# Patient Record
Sex: MALE | Race: WHITE | NOT HISPANIC OR LATINO | Employment: OTHER
[De-identification: names, ages, dates, MRNs, and addresses within clinical notes are randomized per-mention and may not be internally consistent; named-entity substitution may affect disease eponyms.]

---

## 2017-03-21 ENCOUNTER — RECORDS - HEALTHEAST (OUTPATIENT)
Dept: ADMINISTRATIVE | Facility: OTHER | Age: 47
End: 2017-03-21

## 2017-03-28 ENCOUNTER — OFFICE VISIT - HEALTHEAST (OUTPATIENT)
Dept: FAMILY MEDICINE | Facility: CLINIC | Age: 47
End: 2017-03-28

## 2017-03-28 DIAGNOSIS — G89.4 CHRONIC PAIN SYNDROME: ICD-10-CM

## 2017-03-28 DIAGNOSIS — F09 COGNITIVE DISORDER: ICD-10-CM

## 2017-03-28 DIAGNOSIS — Q28.3 CONGENITAL ANOMALY OF CEREBROVASCULAR SYSTEM: ICD-10-CM

## 2017-03-28 DIAGNOSIS — F99 CHRONIC MENTAL ILLNESS: ICD-10-CM

## 2017-03-28 ASSESSMENT — MIFFLIN-ST. JEOR: SCORE: 2049.36

## 2017-09-19 ENCOUNTER — OFFICE VISIT - HEALTHEAST (OUTPATIENT)
Dept: FAMILY MEDICINE | Facility: CLINIC | Age: 47
End: 2017-09-19

## 2017-09-19 DIAGNOSIS — R35.0 URINARY FREQUENCY: ICD-10-CM

## 2017-09-19 DIAGNOSIS — N48.6 PEYRONIE'S DISEASE: ICD-10-CM

## 2017-09-19 DIAGNOSIS — Q28.3 CONGENITAL ANOMALY OF CEREBROVASCULAR SYSTEM: ICD-10-CM

## 2017-09-19 DIAGNOSIS — F99 CHRONIC MENTAL ILLNESS: ICD-10-CM

## 2017-09-19 DIAGNOSIS — Z00.00 ROUTINE GENERAL MEDICAL EXAMINATION AT A HEALTH CARE FACILITY: ICD-10-CM

## 2017-09-19 LAB
CHOLEST SERPL-MCNC: 146 MG/DL
FASTING STATUS PATIENT QL REPORTED: YES
HDLC SERPL-MCNC: 34 MG/DL
LDLC SERPL CALC-MCNC: 89 MG/DL
TRIGL SERPL-MCNC: 117 MG/DL

## 2017-09-19 ASSESSMENT — MIFFLIN-ST. JEOR: SCORE: 1974.41

## 2017-09-20 ENCOUNTER — COMMUNICATION - HEALTHEAST (OUTPATIENT)
Dept: FAMILY MEDICINE | Facility: CLINIC | Age: 47
End: 2017-09-20

## 2018-01-17 ENCOUNTER — COMMUNICATION - HEALTHEAST (OUTPATIENT)
Dept: FAMILY MEDICINE | Facility: CLINIC | Age: 48
End: 2018-01-17

## 2018-01-23 ENCOUNTER — OFFICE VISIT - HEALTHEAST (OUTPATIENT)
Dept: FAMILY MEDICINE | Facility: CLINIC | Age: 48
End: 2018-01-23

## 2018-01-23 DIAGNOSIS — F99 CHRONIC MENTAL ILLNESS: ICD-10-CM

## 2018-01-23 DIAGNOSIS — N48.6 PEYRONIE'S DISEASE: ICD-10-CM

## 2018-01-23 DIAGNOSIS — F09 COGNITIVE DISORDER: ICD-10-CM

## 2018-01-23 DIAGNOSIS — B36.0 TINEA VERSICOLOR: ICD-10-CM

## 2018-01-23 DIAGNOSIS — F32.A DEPRESSION: ICD-10-CM

## 2018-01-23 DIAGNOSIS — F41.9 ANXIETY: ICD-10-CM

## 2018-01-23 DIAGNOSIS — Q28.3 CONGENITAL ANOMALY OF CEREBROVASCULAR SYSTEM: ICD-10-CM

## 2018-02-16 ENCOUNTER — RECORDS - HEALTHEAST (OUTPATIENT)
Dept: ADMINISTRATIVE | Facility: OTHER | Age: 48
End: 2018-02-16

## 2018-03-02 ENCOUNTER — COMMUNICATION - HEALTHEAST (OUTPATIENT)
Dept: SCHEDULING | Facility: CLINIC | Age: 48
End: 2018-03-02

## 2018-03-09 ENCOUNTER — AMBULATORY - HEALTHEAST (OUTPATIENT)
Dept: FAMILY MEDICINE | Facility: CLINIC | Age: 48
End: 2018-03-09

## 2018-03-09 ENCOUNTER — OFFICE VISIT - HEALTHEAST (OUTPATIENT)
Dept: FAMILY MEDICINE | Facility: CLINIC | Age: 48
End: 2018-03-09

## 2018-03-09 DIAGNOSIS — R07.9 CHEST PAIN: ICD-10-CM

## 2018-03-12 LAB
ATRIAL RATE - MUSE: 91 BPM
DIASTOLIC BLOOD PRESSURE - MUSE: NORMAL MMHG
INTERPRETATION ECG - MUSE: NORMAL
P AXIS - MUSE: 22 DEGREES
PR INTERVAL - MUSE: 138 MS
QRS DURATION - MUSE: 86 MS
QT - MUSE: 348 MS
QTC - MUSE: 428 MS
R AXIS - MUSE: 64 DEGREES
SYSTOLIC BLOOD PRESSURE - MUSE: NORMAL MMHG
T AXIS - MUSE: 20 DEGREES
VENTRICULAR RATE- MUSE: 91 BPM

## 2018-03-20 ENCOUNTER — RECORDS - HEALTHEAST (OUTPATIENT)
Dept: ADMINISTRATIVE | Facility: OTHER | Age: 48
End: 2018-03-20

## 2018-03-22 ENCOUNTER — HOSPITAL ENCOUNTER (OUTPATIENT)
Dept: NUCLEAR MEDICINE | Facility: CLINIC | Age: 48
Discharge: HOME | End: 2018-03-22
Attending: FAMILY MEDICINE

## 2018-03-22 ENCOUNTER — COMMUNICATION - HEALTHEAST (OUTPATIENT)
Dept: TELEHEALTH | Facility: CLINIC | Age: 48
End: 2018-03-22

## 2018-03-22 ENCOUNTER — HOSPITAL ENCOUNTER (OUTPATIENT)
Dept: CARDIOLOGY | Facility: CLINIC | Age: 48
Discharge: HOME | End: 2018-03-22
Attending: FAMILY MEDICINE

## 2018-03-22 DIAGNOSIS — R07.9 CHEST PAIN: ICD-10-CM

## 2018-03-22 LAB
CV STRESS CURRENT BP HE: NORMAL
CV STRESS CURRENT HR HE: 100
CV STRESS CURRENT HR HE: 104
CV STRESS CURRENT HR HE: 105
CV STRESS CURRENT HR HE: 106
CV STRESS CURRENT HR HE: 106
CV STRESS CURRENT HR HE: 108
CV STRESS CURRENT HR HE: 108
CV STRESS CURRENT HR HE: 109
CV STRESS CURRENT HR HE: 109
CV STRESS CURRENT HR HE: 114
CV STRESS CURRENT HR HE: 115
CV STRESS CURRENT HR HE: 117
CV STRESS CURRENT HR HE: 119
CV STRESS CURRENT HR HE: 122
CV STRESS CURRENT HR HE: 122
CV STRESS CURRENT HR HE: 126
CV STRESS CURRENT HR HE: 126
CV STRESS CURRENT HR HE: 135
CV STRESS CURRENT HR HE: 137
CV STRESS CURRENT HR HE: 144
CV STRESS CURRENT HR HE: 145
CV STRESS CURRENT HR HE: 146
CV STRESS CURRENT HR HE: 149
CV STRESS CURRENT HR HE: 153
CV STRESS CURRENT HR HE: 154
CV STRESS CURRENT HR HE: 155
CV STRESS CURRENT HR HE: 83
CV STRESS CURRENT HR HE: 87
CV STRESS CURRENT HR HE: 96
CV STRESS DEVIATION TIME HE: NORMAL
CV STRESS ECHO PERCENT HR HE: NORMAL
CV STRESS EXERCISE STAGE HE: NORMAL
CV STRESS EXERCISE STAGE REACHED HE: NORMAL
CV STRESS FINAL RESTING BP HE: NORMAL
CV STRESS FINAL RESTING HR HE: 108
CV STRESS MAX HR HE: 156
CV STRESS MAX TREADMILL GRADE HE: 14
CV STRESS MAX TREADMILL SPEED HE: 3.4
CV STRESS PEAK DIA BP HE: NORMAL
CV STRESS PEAK SYS BP HE: NORMAL
CV STRESS PHASE HE: NORMAL
CV STRESS PROTOCOL HE: NORMAL
CV STRESS RESTING PT POSITION HE: NORMAL
CV STRESS RESTING PT POSITION HE: NORMAL
CV STRESS ST DEVIATION AMOUNT HE: NORMAL
CV STRESS ST DEVIATION ELEVATION HE: NORMAL
CV STRESS ST EVELATION AMOUNT HE: NORMAL
CV STRESS TEST TYPE HE: NORMAL
CV STRESS TOTAL STAGE TIME MIN 1 HE: NORMAL
NUC STRESS EJECTION FRACTION: 70 %
STRESS ECHO BASELINE BP: NORMAL
STRESS ECHO BASELINE HR: 87
STRESS ECHO CALCULATED PERCENT HR: 89 %
STRESS ECHO LAST STRESS BP: NORMAL
STRESS ECHO LAST STRESS HR: 154
STRESS ECHO POST ESTIMATED WORKLOAD: 8.9
STRESS ECHO POST EXERCISE DUR MIN: 7
STRESS ECHO POST EXERCISE DUR SEC: 20
STRESS ECHO TARGET HR: 149

## 2018-03-29 ENCOUNTER — OFFICE VISIT - HEALTHEAST (OUTPATIENT)
Dept: CARDIOLOGY | Facility: CLINIC | Age: 48
End: 2018-03-29

## 2018-03-29 DIAGNOSIS — R07.2 PRECORDIAL PAIN: ICD-10-CM

## 2018-03-29 ASSESSMENT — MIFFLIN-ST. JEOR: SCORE: 2035.19

## 2018-04-20 ENCOUNTER — RECORDS - HEALTHEAST (OUTPATIENT)
Dept: ADMINISTRATIVE | Facility: OTHER | Age: 48
End: 2018-04-20

## 2019-10-07 ENCOUNTER — RECORDS - HEALTHEAST (OUTPATIENT)
Dept: ADMINISTRATIVE | Facility: OTHER | Age: 49
End: 2019-10-07

## 2019-10-10 ENCOUNTER — HOSPITAL ENCOUNTER (OUTPATIENT)
Dept: MRI IMAGING | Facility: CLINIC | Age: 49
Discharge: HOME | End: 2019-10-10

## 2019-10-10 DIAGNOSIS — I67.1 CEREBRAL ANEURYSM: ICD-10-CM

## 2019-11-04 ENCOUNTER — OFFICE VISIT - HEALTHEAST (OUTPATIENT)
Dept: FAMILY MEDICINE | Facility: CLINIC | Age: 49
End: 2019-11-04

## 2019-11-04 DIAGNOSIS — Q28.3 CONGENITAL ANOMALY OF CEREBROVASCULAR SYSTEM: ICD-10-CM

## 2019-11-04 DIAGNOSIS — N48.6 PEYRONIE'S DISEASE: ICD-10-CM

## 2019-11-04 DIAGNOSIS — E55.9 VITAMIN D DEFICIENCY: ICD-10-CM

## 2019-11-04 DIAGNOSIS — F99 CHRONIC MENTAL ILLNESS: ICD-10-CM

## 2019-11-04 DIAGNOSIS — Z00.00 ROUTINE GENERAL MEDICAL EXAMINATION AT A HEALTH CARE FACILITY: ICD-10-CM

## 2019-11-04 LAB
CHOLEST SERPL-MCNC: 142 MG/DL
FASTING STATUS PATIENT QL REPORTED: YES
FASTING STATUS PATIENT QL REPORTED: YES
GLUCOSE BLD-MCNC: 68 MG/DL (ref 70–125)
HDLC SERPL-MCNC: 37 MG/DL
LDLC SERPL CALC-MCNC: 84 MG/DL
TRIGL SERPL-MCNC: 104 MG/DL

## 2019-11-04 ASSESSMENT — MIFFLIN-ST. JEOR: SCORE: 2006.61

## 2019-11-05 ENCOUNTER — COMMUNICATION - HEALTHEAST (OUTPATIENT)
Dept: FAMILY MEDICINE | Facility: CLINIC | Age: 49
End: 2019-11-05

## 2019-11-05 LAB — 25(OH)D3 SERPL-MCNC: 25.8 NG/ML (ref 30–80)

## 2020-11-06 ENCOUNTER — OFFICE VISIT - HEALTHEAST (OUTPATIENT)
Dept: FAMILY MEDICINE | Facility: CLINIC | Age: 50
End: 2020-11-06

## 2020-11-06 DIAGNOSIS — F99 CHRONIC MENTAL ILLNESS: ICD-10-CM

## 2020-11-06 DIAGNOSIS — R33.9 INCOMPLETE BLADDER EMPTYING: ICD-10-CM

## 2020-11-06 DIAGNOSIS — R39.15 URINARY URGENCY: ICD-10-CM

## 2020-11-06 DIAGNOSIS — E55.9 VITAMIN D DEFICIENCY: ICD-10-CM

## 2020-11-06 DIAGNOSIS — N48.6 PEYRONIE'S DISEASE: ICD-10-CM

## 2020-11-06 DIAGNOSIS — Z00.00 ROUTINE GENERAL MEDICAL EXAMINATION AT A HEALTH CARE FACILITY: ICD-10-CM

## 2020-11-06 DIAGNOSIS — Q28.3 CONGENITAL ANOMALY OF CEREBROVASCULAR SYSTEM: ICD-10-CM

## 2020-11-06 LAB
CHOLEST SERPL-MCNC: 159 MG/DL
FASTING STATUS PATIENT QL REPORTED: YES
FASTING STATUS PATIENT QL REPORTED: YES
GLUCOSE BLD-MCNC: 81 MG/DL (ref 70–125)
HDLC SERPL-MCNC: 38 MG/DL
LDLC SERPL CALC-MCNC: 92 MG/DL
TRIGL SERPL-MCNC: 145 MG/DL

## 2020-11-06 ASSESSMENT — MIFFLIN-ST. JEOR: SCORE: 2017.05

## 2020-11-09 LAB — 25(OH)D3 SERPL-MCNC: 18.9 NG/ML (ref 30–80)

## 2020-11-10 ENCOUNTER — COMMUNICATION - HEALTHEAST (OUTPATIENT)
Dept: FAMILY MEDICINE | Facility: CLINIC | Age: 50
End: 2020-11-10

## 2020-11-13 ENCOUNTER — RECORDS - HEALTHEAST (OUTPATIENT)
Dept: ADMINISTRATIVE | Facility: OTHER | Age: 50
End: 2020-11-13

## 2020-11-24 ENCOUNTER — RECORDS - HEALTHEAST (OUTPATIENT)
Dept: ADMINISTRATIVE | Facility: OTHER | Age: 50
End: 2020-11-24

## 2020-11-24 ENCOUNTER — COMMUNICATION - HEALTHEAST (OUTPATIENT)
Dept: FAMILY MEDICINE | Facility: CLINIC | Age: 50
End: 2020-11-24

## 2020-11-24 DIAGNOSIS — E55.9 VITAMIN D DEFICIENCY: ICD-10-CM

## 2020-11-30 ENCOUNTER — RECORDS - HEALTHEAST (OUTPATIENT)
Dept: ADMINISTRATIVE | Facility: OTHER | Age: 50
End: 2020-11-30

## 2020-12-16 ENCOUNTER — COMMUNICATION - HEALTHEAST (OUTPATIENT)
Dept: FAMILY MEDICINE | Facility: CLINIC | Age: 50
End: 2020-12-16

## 2020-12-29 ENCOUNTER — RECORDS - HEALTHEAST (OUTPATIENT)
Dept: ADMINISTRATIVE | Facility: OTHER | Age: 50
End: 2020-12-29

## 2021-03-31 ENCOUNTER — AMBULATORY - HEALTHEAST (OUTPATIENT)
Dept: NURSING | Facility: CLINIC | Age: 51
End: 2021-03-31

## 2021-04-21 ENCOUNTER — AMBULATORY - HEALTHEAST (OUTPATIENT)
Dept: NURSING | Facility: CLINIC | Age: 51
End: 2021-04-21

## 2021-05-30 VITALS — BODY MASS INDEX: 30.09 KG/M2 | WEIGHT: 242 LBS | HEIGHT: 75 IN

## 2021-05-31 VITALS — WEIGHT: 235 LBS | BODY MASS INDEX: 29.37 KG/M2

## 2021-05-31 VITALS — WEIGHT: 224.6 LBS | HEIGHT: 75 IN | BODY MASS INDEX: 27.93 KG/M2

## 2021-06-01 VITALS — WEIGHT: 236 LBS | BODY MASS INDEX: 29.5 KG/M2

## 2021-06-01 VITALS — HEIGHT: 75 IN | BODY MASS INDEX: 29.59 KG/M2 | WEIGHT: 238 LBS

## 2021-06-03 VITALS
DIASTOLIC BLOOD PRESSURE: 80 MMHG | BODY MASS INDEX: 28.81 KG/M2 | HEART RATE: 95 BPM | OXYGEN SATURATION: 99 % | HEIGHT: 75 IN | WEIGHT: 231.7 LBS | SYSTOLIC BLOOD PRESSURE: 128 MMHG

## 2021-06-03 NOTE — PROGRESS NOTES
" Patient ID: Fred Shukla is a 49 y.o. male.  /80   Pulse 95   Ht 6' 3\" (1.905 m)   Wt (!) 231 lb 11.2 oz (105.1 kg)   SpO2 99%   BMI 28.96 kg/m      Assessment/Plan:                   Diagnoses and all orders for this visit:    Routine general medical examination at a health care facility  -     Lipid Cascade  -     Glucose    Cerebral Arteriovenous Malformation    Chronic mental illness    Peyronie's disease    Vitamin D deficiency  -     Vitamin D, Total (25-Hydroxy)      DISCUSSION  See discussion below obtain labs.  Subjective:     HPI    Fred Shukla is a 49-year-old man.  He is currently on disability.  I have not seen him for a year and a half.  He has a history of a cerebral arteriovenous malformation.  He has a cognitive disorder that is likely in part connected with his cerebral arterial malformation and subsequent treatment.  He has a history of anxiety depression symptoms.  He is previous been on medications and seen a therapist.  He also has a history of Nelly's disease and other urinary difficulties for which she is seeing urology.  He had been on medication therapy.  The idea of surgical treatment have been discussed regarding treatment of his Nlely's disease but he declined.  He has not seen urology in about a year and a half.  He has a history of vitamin D deficiency.    Unfortunately seems to be relatively isolated.  Since being on disability spends most of his time at home.  He admits that he does not interact with other people.  He has no structured activities.  He does have some resources to be able to participate in structured activities including exercise which is encouraged.  We spent some time today discussing the benefits of being more active involved in the community.  We discussed possibility of part-time job, volunteer opportunities or even just going to the [facility] interacting with others and exercising.  He seems to understand this.  He declines any consideration to " consider medication therapy although his PHQ 9 score today is 10.  He also declines any referral back to see a cognitive behavioral therapist.    He does report urinary frequency and some urgency.  We discussed factors that can contribute to this.  We discussed previous medication usage which he felt was never very helpful.  We discussed ways to intervene from lifestyle modification standpoint to overall improve symptoms.  Based on what he describes it seems to be no significant change compared to what we have discussed previously.    There is no indication to proceed with colon cancer screening or prostate cancer screening at this time.  His immunizations are up-to-date.  We discussed screening for cholesterol and blood sugar today.    Review of Systems  Complete review of systems is obtained.  Other than the specific considerations noted above complete review of systems is negative.      Objective:   Medications:  Current Outpatient Medications   Medication Sig Note     cholecalciferol, vitamin D3, (VITAMIN D3) 1,000 unit capsule Take 1 capsule (1,000 Units total) by mouth daily.      ibuprofen (ADVIL,MOTRIN) 200 MG tablet Take 200 mg by mouth every 6 (six) hours as needed for pain.      multivitamin therapeutic tablet Take 1 tablet by mouth daily.      naproxen sodium (ALEVE) 220 MG tablet Take 220 mg by mouth 2 (two) times a day with meals. Patient uses rarely prn 12/10/2015: B/C patient is taking diclofenac now.     Allergies:  Allergies   Allergen Reactions     Oxycodone Itching     Probably Histamine release  Tolerate other narcotics     Tobacco:   reports that he has quit smoking. He quit smokeless tobacco use about 15 years ago.  His smokeless tobacco use included chew.    HEALTH PREVENTION    General  Dental care: Discussed the importance of regular dental care.  Eye care: Discussed importance of routine eye exams for glaucoma screening  Exercise: Discussed the importance of regular exercise.  Diet: Eugene  "the importance of a healthy balanced diet    Wt Readings from Last 3 Encounters:   11/04/19 (!) 231 lb 11.2 oz (105.1 kg)   03/29/18 (!) 238 lb (108 kg)   03/09/18 (!) 236 lb (107 kg)     Body mass index is 28.96 kg/m .    The following high BMI interventions were performed this visit: encouragement to exercise      Cholesterol:   LDL Calculated (mg/dL)   Date Value   09/19/2017 89   11/02/2016 102   12/19/2011 92      Blood Pressure:   BP Readings from Last 3 Encounters:   11/04/19 128/80   03/29/18 120/70   03/09/18 128/78     Immunization History   Administered Date(s) Administered     Hep A, Adult IM (19yr & older) 11/27/2009, 05/27/2010     Hep A, historic 11/27/2009, 05/27/2010     Influenza P2k2-64, 11/27/2009     Influenza, inj, historic,unspecified 10/10/2009, 10/17/2011, 09/25/2014, 09/18/2015, 11/02/2016, 10/18/2019     Influenza, seasonal,quad inj 6-35 mos 01/05/2009, 10/28/2009, 10/28/2010, 10/19/2012     Influenza,seasonal quad, PF, =/> 6months 09/25/2014, 09/19/2017     Influenza,seasonal, Inj IIV3 01/05/2009, 10/28/2009, 10/28/2010, 10/17/2011, 10/19/2012     Influenza,seasonal,quad inj =/> 6months 09/18/2015, 11/02/2016     Novel Influenza [id number], Nasal 11/27/2009     Td, Adult, Absorbed 06/26/2007     Td,adult,historic,unspecified 08/05/2011     Tdap 08/05/2011     There are no preventive care reminders to display for this patient.     Physical Exam      /80   Pulse 95   Ht 6' 3\" (1.905 m)   Wt (!) 231 lb 11.2 oz (105.1 kg)   SpO2 99%   BMI 28.96 kg/m            General Appearance:    Alert, cooperative, no distress   Eyes:   No scleral icterus or conjunctival irritation       Ears:    Normal TM's and external ear canals, both ears   Throat:   Lips, mucosa, and tongue normal; teeth and gums normal   Neck:   Supple, symmetrical, trachea midline, no adenopathy;        thyroid:  No enlargement/tenderness/nodules   Lungs:     Clear to auscultation bilaterally, respirations unlabored, no " wheezes or crackles   Heart:    Regular rate and rhythm,  No murmur   Abdomen:    Soft, no distention, no tenderness on palpation, no masses, no organomegaly     Extremities:  No edema, no joint swelling or redness, no evidence of any injuries   Skin:  No concerning skin findings, no suspicious moles, no rashes   Neurologic:  On gross examination there is no motor or sensory deficit.  Patient walks with a normal gait

## 2021-06-05 VITALS — HEART RATE: 101 BPM | WEIGHT: 234 LBS | OXYGEN SATURATION: 98 % | BODY MASS INDEX: 29.09 KG/M2 | HEIGHT: 75 IN

## 2021-06-09 NOTE — PROGRESS NOTES
" Patient ID: Fred Shukla is a 46 y.o. male.  /76  Ht 6' 2.75\" (1.899 m)  Wt (!) 242 lb (109.8 kg)  BMI 30.45 kg/m2    Assessment/Plan:                   Diagnoses and all orders for this visit:    Cognitive disorder    Cerebral Arteriovenous Malformation    Chronic mental illness    Chronic pain syndrome        DISCUSSION  Reviewed health history, paperwork completed.  Patient is encouraged to continue to follow up with specialty providers.  Subjective:     HPI    Fred Shkula is a 46-year-old man with a complex medical history.  He underwent surgical treatment of an arterial venous malformation.  Since that time he has had evolving cognitive concerns both from a mental health standpoint but likely from a more permanent organic standpoint likely related to his surgical intervention.  He has been evaluated extensively by specialty providers.  He has been seeing psychiatry, a pain specialist, a urologist for other issues.  Currently not taking any prescription medication.  He previously been on multiple medications including pain medications and mental health medications.  He does have follow-up scheduled with his mental health provider today.  He states overall he has been doing well.  He brings with him forms regarding his ability to work.  Because his concentration, interactive abilities are impaired he has not been able to work.  He likely has some temporary impairment stemming from his anxiety and depression symptoms but likely a more permanent component stemming from his other underlying cognitive disorder.  He previously undergone extensive neuropsychiatric evaluation which confirmed the presence of this more significant finding.  He reports little change.    Review of Systems  Complete review of systems is obtained.  Other than the specific considerations noted above complete review of systems is negative.          Objective:   Medications:  Current Outpatient Prescriptions   Medication Sig " "Note     cholecalciferol, vitamin D3, (VITAMIN D3) 2,000 unit cap Take by mouth.      cholecalciferol, vitamin D3, 1,000 unit tablet Take 5,000 Units by mouth daily.       ibuprofen (ADVIL,MOTRIN) 200 MG tablet Take 200 mg by mouth every 6 (six) hours as needed for pain.      naproxen sodium (ALEVE) 220 MG tablet Take 220 mg by mouth 2 (two) times a day with meals. Patient uses rarely prn 12/10/2015: B/C patient is taking diclofenac now.       Allergies:  Allergies   Allergen Reactions     Oxycodone Itching     Probably Histamine release  Tolerate other narcotics       Tobacco:   reports that he has quit smoking. He quit smokeless tobacco use about 12 years ago. His smokeless tobacco use included Chew.     Physical Exam          /76  Ht 6' 2.75\" (1.899 m)  Wt (!) 242 lb (109.8 kg)  BMI 30.45 kg/m2        General Appearance:    Alert, cooperative, no distress                     "

## 2021-06-12 NOTE — PROGRESS NOTES
" Patient ID: Fred Shukla is a 50 y.o. male.  Pulse (!) 101   Ht 6' 3\" (1.905 m)   Wt (!) 234 lb (106.1 kg)   SpO2 98%   BMI 29.25 kg/m      Assessment/Plan:                Diagnoses and all orders for this visit:    Routine general medical examination at a health care facility  -     Glucose  -     Lipid Cascade    Cerebral Arteriovenous Malformation    Chronic mental illness    Peyronie's disease  -     Ambulatory referral to Urology    Vitamin D deficiency  -     Vitamin D, Total (25-Hydroxy)    Urinary urgency  -     Ambulatory referral to Urology    Incomplete bladder emptying  -     Ambulatory referral to Urology    Other orders  -     Cancel: Influenza, Seasonal Quad, PF =/> 6months      DISCUSSION  Obtain labs as above.    See discussion below.    Strive to get more regular physical activity discussed a specific plan.  Strive to get more fruits and vegetables in diet discussed a specific plan.    Referral to urology for urologic concerns as discussed below.  Subjective:     HPI    Fred Shukla is a 50-year-old man who is here today for a physical.  He is on disability.  I have not seen him for 1 year.  His medical history significant for central arteriovenous malformation, he has an underlying cognitive disorder that is likely partly connected to his treatment of this disorder with surgery in the more distant past.  He has a history of both anxiety and depression symptoms.  Patient states he spends most of his time in isolation at home.  He does interact with his parents.  We discussed the importance again today as we have previously of trying to get out and be more interactive in the community and getting more exercise.  We discussed dietary improvement.    He has a history of urinary symptoms which have included urgency and frequency.  He reports these are ongoing and worsening.  In the past he has seen urology.  He has tried medications including tamsulosin and oxybutynin.  He has not had " benefit from medication.  Given the complexity of his situation and reported worsening symptoms for which she would like to do something we discussed having him return to see urology.  In addition he has Peyronie's disease.  He had been indecisive about any more definitive treatment approach but would like to discuss that with them further.    Discussed routine health preventive measures today.    Review of Systems  Complete review of systems is obtained.  Other than the specific considerations noted above complete review of systems is negative.        Objective:   Medications:  Current Outpatient Medications   Medication Sig Note     cholecalciferol, vitamin D3, (VITAMIN D3) 1,000 unit capsule Take 1 capsule (1,000 Units total) by mouth daily.      ibuprofen (ADVIL,MOTRIN) 200 MG tablet Take 200 mg by mouth every 6 (six) hours as needed for pain.      multivitamin therapeutic tablet Take 1 tablet by mouth daily.      naproxen sodium (ALEVE) 220 MG tablet Take 220 mg by mouth 2 (two) times a day with meals. Patient uses rarely prn 12/10/2015: B/C patient is taking diclofenac now.     Allergies:  Allergies   Allergen Reactions     Oxycodone Itching     Probably Histamine release  Tolerate other narcotics     Tobacco:   reports that he has quit smoking. He quit smokeless tobacco use about 16 years ago.  His smokeless tobacco use included chew.    HEALTH PREVENTION  General  Dental care: Discussed the importance of regular dental care.  Eye care: Discussed importance of routine eye exams for glaucoma screening  Exercise: Discussed the importance of regular exercise.  Diet: Discussed [place] ways to improve his diet, namely increasing fruits and vegetable consumption.    Wt Readings from Last 3 Encounters:   11/06/20 (!) 234 lb (106.1 kg)   07/23/20 (!) 230 lb (104.3 kg)   11/04/19 (!) 231 lb 11.2 oz (105.1 kg)     Body mass index is 29.25 kg/m .    The following high BMI interventions were performed this visit:  "encouragement to exercise    Cholesterol:   LDL Calculated (mg/dL)   Date Value   11/04/2019 84   09/19/2017 89   11/02/2016 102      Blood Pressure:   BP Readings from Last 3 Encounters:   07/23/20 147/90   11/04/19 128/80   03/29/18 120/70     Immunization History   Administered Date(s) Administered     Hep A, Adult IM (19yr & older) 11/27/2009, 05/27/2010     Hep A, historic 11/27/2009, 05/27/2010     INFLUENZA,SEASONAL QUAD, PF, =/> 6months 09/25/2014, 09/19/2017     Influenza R2m9-60, 11/27/2009     Influenza, inj, historic,unspecified 10/10/2009, 10/17/2011, 09/25/2014, 09/18/2015, 11/02/2016, 10/18/2019, 10/02/2020     Influenza, seasonal,quad inj 6-35 mos 01/05/2009, 10/28/2009, 10/28/2010, 10/19/2012     Influenza,seasonal, Inj IIV3 01/05/2009, 10/28/2009, 10/28/2010, 10/17/2011, 10/19/2012     Influenza,seasonal,quad inj =/> 6months 09/18/2015, 11/02/2016     Novel Influenza [id number], Nasal 11/27/2009     Td, Adult, Absorbed 06/26/2007     Td,adult,historic,unspecified 08/05/2011     Tdap 08/05/2011, 07/23/2020     There are no preventive care reminders to display for this patient.     Physical Exam      Pulse (!) 101   Ht 6' 3\" (1.905 m)   Wt (!) 234 lb (106.1 kg)   SpO2 98%   BMI 29.25 kg/m      General Appearance:    Alert, cooperative, no distress   Eyes:   No scleral icterus or conjunctival irritation       Ears:    Normal TM's and external ear canals, both ears   Throat:   Lips, mucosa, and tongue normal; teeth and gums normal   Neck:   Supple, symmetrical, trachea midline, no adenopathy;        thyroid:  No enlargement/tenderness/nodules   Lungs:     Clear to auscultation bilaterally, respirations unlabored, no wheezes or crackles   Heart:    Regular rate and rhythm,  No murmur   Abdomen:    Soft, no distention, no tenderness on palpation, no masses, no organomegaly     Extremities:  No edema, no joint swelling or redness, no evidence of any injuries   Skin:  No concerning skin findings, no " suspicious moles, no rashes   Neurologic:  On gross examination there is no motor or sensory deficit.  Patient walks with a normal gait

## 2021-06-13 NOTE — TELEPHONE ENCOUNTER
Refill Request  Did you contact pharmacy: No  Medication name:   Requested Prescriptions     Pending Prescriptions Disp Refills     cholecalciferol, vitamin D3, (VITAMIN D3) 25 mcg (1,000 unit) capsule 90 capsule 3     Sig: Take 1 capsule (1,000 Units total) by mouth daily.     Who prescribed the medication: Edmond Pemberton MD   Requested Pharmacy: [facility]  Is patient out of medication: Yes  Patient notified refills processed in 3 business days:  yes  Okay to leave a detailed message: yes  The patient states he did not receive the letter of his test results and would like another letter mailed.

## 2021-06-13 NOTE — PROGRESS NOTES
" Patient ID: Fred Shukla is a 47 y.o. male.  /76  Pulse 84  Ht 6' 3\" (1.905 m)  Wt (!) 224 lb 9.6 oz (101.9 kg)  SpO2 96%  BMI 28.07 kg/m2    Assessment/Plan:                   Diagnoses and all orders for this visit:    Routine general medical examination at a health care facility  -     Lipid Cascade FASTING  -     Glucose; Future  -     Influenza, Seasonal,Quad Inj, 36+ MOS    Cerebral Arteriovenous Malformation    Peyronie's disease    Chronic mental illness    Urinary frequency  -     Urinalysis    Other orders  -     Influenza, Seasonal Quad, Preservative Free 36+ Months    DISCUSSION  Check labs as above.  Flu shot today.  Reviewed routine health prevention.  Documentation completed at patient request.  He is encouraged to follow with specialty providers.  Subjective:     HPI    Fred Shukla is a 47-year-old man who is here today for a physical.  Is a history of arteriovenous malformation in the brain for which she underwent surgery.  Following this noted increasing cognitive difficulties and depression as well as anxiety symptoms.  He underwent neurocognitive testing in November 2015.  He does have likely a nonspecified neurocognitive disorder.  This has remained stable.  He had been treated with antidepressant medication for his mental health concerns but in conjunction with other care providers have decided to stop medications as he felt there was not a significant benefit for the amount of side effects he experienced.  He continues to see behavioral therapist.  Reports overall symptoms stable.  Has made strides in being able to manage his daily life better.  Does have community resources for helping with this.  He has not been able to be employed because of these disabilities.  He does have mildly elevated cholesterol.  Today we also reviewed other routine health preventive measures.  He does complain of increased urinary frequency and decreased urine stream strength.  Reviewed other " medical concerns as noted above.  Discussed influenza vaccine.  Reviewed social and family history.    Review of Systems  Complete review of systems is obtained.  Other than the specific considerations noted above complete review of systems is negative.    Objective:   Medications:  Current Outpatient Prescriptions   Medication Sig Note     multivitamin therapeutic tablet Take 1 tablet by mouth daily.      cholecalciferol, vitamin D3, (VITAMIN D3) 2,000 unit cap Take by mouth.      cholecalciferol, vitamin D3, 1,000 unit tablet Take 5,000 Units by mouth daily.       ibuprofen (ADVIL,MOTRIN) 200 MG tablet Take 200 mg by mouth every 6 (six) hours as needed for pain.      naproxen sodium (ALEVE) 220 MG tablet Take 220 mg by mouth 2 (two) times a day with meals. Patient uses rarely prn 12/10/2015: B/C patient is taking diclofenac now.     Allergies:  Allergies   Allergen Reactions     Oxycodone Itching     Probably Histamine release  Tolerate other narcotics     Tobacco:   reports that he has quit smoking. He quit smokeless tobacco use about 13 years ago. His smokeless tobacco use included Chew.    HEALTH PREVENTION  General  Dental care: Discussed the importance of regular dental care.  Eye care: Discussed importance of routine eye exams for glaucoma screening  Exercise: Discussed establishing a regular exercise regimen.  Diet: Discussed the importance of a healthy balanced diet.  Congratulated him on some diet changes he has made recently.    Wt Readings from Last 3 Encounters:   09/19/17 (!) 224 lb 9.6 oz (101.9 kg)   03/28/17 (!) 242 lb (109.8 kg)   12/09/16 (!) 251 lb 12.8 oz (114.2 kg)     Body mass index is 28.07 kg/(m^2).    The following high BMI interventions were performed this visit: encouragement to exercise    Cancer screening  Testicular cancer:is discussed and exam performed today  Skin cancer: Discussed sun burn prevention and self monitoring.  Colon cancer: Colon cancer screening is discussed.  Not  "indicated at this time.  Prostate cancer: Not indicated at this time.    Cholesterol:   LDL Calculated (mg/dL)   Date Value   11/02/2016 102   12/19/2011 92   11/27/2009 84      Blood Pressure:   BP Readings from Last 3 Encounters:   09/19/17 126/76   03/28/17 126/76   12/09/16 122/82     Immunization History   Administered Date(s) Administered     Hep A, historic 11/27/2009, 05/27/2010     Influenza, inj, historic 10/10/2009, 10/17/2011, 09/25/2014     Influenza, seasonal,quad inj 36+ mos 09/18/2015, 11/02/2016     Influenza, seasonal,quad inj 6-35 mos 01/05/2009, 10/28/2009, 10/28/2010, 10/19/2012     Novel Influenza Q4I4-30, Nasal 11/27/2009     Td, historic 06/07/2007, 08/05/2011     Tdap 11/01/2011     There are no preventive care reminders to display for this patient.     Physical Exam      /76  Pulse 84  Ht 6' 3\" (1.905 m)  Wt (!) 224 lb 9.6 oz (101.9 kg)  SpO2 96%  BMI 28.07 kg/m2    General Appearance:    Alert, cooperative, no distress, appears stated age   Head:    Normocephalic, without obvious abnormality, atraumatic   Eyes:    PERRL, conjunctiva/corneas clear, EOM's intact       Ears:    Normal TM's and external ear canals, both ears   Nose:   Nares normal, septum midline, mucosa normal, no drainage    or sinus tenderness   Throat:   Lips, mucosa, and tongue normal; teeth and gums normal   Neck:   Supple, symmetrical, trachea midline, no adenopathy;        thyroid:  No enlargement/tenderness/nodules   Lungs:     Clear to auscultation bilaterally, respirations unlabored   Heart:    Regular rate and rhythm, S1 and S2 normal, no murmur, rub   or gallop   Abdomen:     Soft, non-tender, bowel sounds active all four quadrants,     no masses, no organomegaly   Genitalia:   Normal testicular anatomy no inguinal hernias   Rectal:   Smooth uniform prostate normal size no enlargement   Extremities:   Extremities normal, atraumatic, no cyanosis or edema   Skin:   Skin color, texture, turgor normal, no " rashes or lesions   Neurologic:   CNII-XII intact. Normal strength, sensation

## 2021-06-13 NOTE — TELEPHONE ENCOUNTER
Who is calling:  Patient   Reason for Call:  Patient stated he never got his lab results from 11/6/2020. Patient stated he would like a copy mailed to his home address.  Date of last appointment with primary care: n/a  Okay to leave a detailed message: Yes  [phone]

## 2021-06-15 NOTE — PROGRESS NOTES
Patient ID: Fred Shukla is a 47 y.o. male.  /76  Pulse 86  Wt (!) 235 lb (106.6 kg)  BMI 29.37 kg/m2    Assessment/Plan:                Diagnoses and all orders for this visit:    Cognitive disorder    Cerebral Arteriovenous Malformation    Chronic mental illness    Anxiety    Depression    Tinea versicolor    Peyronie's disease  -     Ambulatory referral to Urology    DISCUSSION  Paperwork completed per discussion below.  Referral as noted above.  Continue to see mental health therapist and to rely on community support as discussed below.  No treatment for tinea versicolor now it is is relatively mild.  Consider topical antifungal as first line in the future.  Subjective:     HPI    Fred Shukla is a 47 y.o. male who is here today to discuss several issues.  He is a history of arteriovenous malformation that required surgical intervention.  He has a history of anxiety and depression symptoms.  He is currently not taking any medications for management of his chronic mental health concerns.  He does currently have a mental health therapist that he sees on a regular basis.  He also has an arms worker who helps him to manage other aspects of his life and coordinate his care.  Patient overall reports everything is going well but does not report any significant difference in his symptoms compared to her last visit.  Overall he seems to be coping well and functioning in his environment.  He does need a form completed regarding his ability to be employed.  Because of his ongoing anxiety depression symptoms and his cognitive disorder his interpersonal interactions, concentration ability, ability to follow directions and tasks is impaired.  Because of this he will not be able to seek employment in the near future.  His forms are completed to reflect this overall opinion.  We reviewed considerations regarding the importance of keeping up with his network of support to manage his mental health concerns.  He  does have other medical conditions which we discussed briefly today including tinea versicolor and Peyronie's disease.  He does request visiting with urology again to discuss the Peyronie's disease further in terms of treatment.    He does have tinea versicolor we discussed this as well.  We discussed this at his last visit.    Review of Systems  Complete review of systems is obtained.  Other than the specific considerations noted above complete review of systems is negative.        Objective:   Medications:  Current Outpatient Prescriptions   Medication Sig Note     ibuprofen (ADVIL,MOTRIN) 200 MG tablet Take 200 mg by mouth every 6 (six) hours as needed for pain.      multivitamin therapeutic tablet Take 1 tablet by mouth daily.      naproxen sodium (ALEVE) 220 MG tablet Take 220 mg by mouth 2 (two) times a day with meals. Patient uses rarely prn 12/10/2015: B/C patient is taking diclofenac now.     Allergies:  Allergies   Allergen Reactions     Oxycodone Itching     Probably Histamine release  Tolerate other narcotics     Tobacco:   reports that he has quit smoking. He quit smokeless tobacco use about 13 years ago. His smokeless tobacco use included Chew.     Physical Exam      /76  Pulse 86  Wt (!) 235 lb (106.6 kg)  BMI 29.37 kg/m2    General Appearance:    Alert, cooperative, no distress   Eyes:    No conjunctival irritation, no scleral icterus       Ears:    Normal TM's and external ear canals, both ears   Throat:   Lips, mucosa, and tongue normal; teeth and gums normal   Neck:   Supple, symmetrical, trachea midline, no adenopathy;        thyroid:  No enlargement/tenderness/nodules   Lungs:     Clear to auscultation bilaterally, respirations unlabored   Heart:    Regular rate and rhythm, no murmur, rub or gallop   Abdomen:     Soft, non-tender   Extremities:   Extremities normal, atraumatic, no cyanosis or edema   Skin:   Skin color, texture, turgor normal, no rashes or lesions   Neurologic:   Normal  strength and sensation

## 2021-06-16 NOTE — PROGRESS NOTES
Patient ID: Fred Shukla is a 47 y.o. male.  /78  Pulse 96  Wt (!) 236 lb (107 kg)  SpO2 98%  BMI 29.5 kg/m2    Assessment/Plan:                Diagnoses and all orders for this visit:    Chest pain  -     Electrocardiogram Perform and Read  -     XR Chest 2 Views  -     NM Exercise Stress Test; Future; Expected date: 3/9/18    Other orders  -     Cancel: Amb[facility] referral [facility]  -     cholecalciferol, vitamin D3, (VITAMIN D3) 1,000 unit capsule; Take 1 capsule (1,000 Units total) by mouth daily.  Dispense: 90 capsule; Refill: 3      DISCUSSION  Repeat chest x-ray and EKG    Neither the x-ray or EKG shows any sign of an evolving problem.  This is more reassurance that there is not any evolving infectious process or cardiac problems such as myocardial infarction or ischemia.    I am most suspicious that his pain is a musculoskeletal etiology or pleurisy with musculoskeletal etiology being the most likely.    Because of the nature of the concern I do think it is still worthwhile to go to the next step and obtain a stress test to rule out ischemia but do not feel he needs to be done on an emergent basis and we will make arrangements to have this done within the next week.  If he has any worsening pain or new symptoms he should be reevaluated on an emergent basis.  Subjective:     HPI    Fred Shukla is a 47 y.o. male who is here today to follow-up on chest pain.  He had developed chest pain last Thursday over 1 week ago.  He waited one day and then went to the emergency room the next day where he was evaluated with laboratory testing, chest x-ray and EKG.  There is no distinct etiology for his pain.  Patient comes in today stating that over the course of the week he still has a strange sensation in his left anterior chest.  He does not feel short of breath.  His appetite is normal.  He states that he has a lot of aches and pains throughout his upper body that are chronic and it is  difficult to discern this current discomfort from his more chronic pain.  He denies any coughing wheezing nausea vomiting diarrhea headaches syncope.  His vascular disease risk factors are minimal.  He is somewhat inactive, he does not smoke currently a more distant smoker for a short period of time, he has favorable cholesterol and blood pressure not on treatment for either.  No family history of early coronary artery disease.  A D-dimer test was noted to be undetectable.    Review of Systems  Complete review of systems is obtained.  Other than the specific considerations noted above complete review of systems is negative.        Objective:   Medications:  Current Outpatient Prescriptions   Medication Sig Note     ibuprofen (ADVIL,MOTRIN) 200 MG tablet Take 200 mg by mouth every 6 (six) hours as needed for pain.      multivitamin therapeutic tablet Take 1 tablet by mouth daily.      naproxen sodium (ALEVE) 220 MG tablet Take 220 mg by mouth 2 (two) times a day with meals. Patient uses rarely prn 12/10/2015: B/C patient is taking diclofenac now.     tamsulosin (FLOMAX) 0.4 mg Cp24 Take 0.4 mg by mouth daily. 3/9/2018: Received from: External Pharmacy Received Sig: TK 1 C PO QD     cholecalciferol, vitamin D3, (VITAMIN D3) 1,000 unit capsule Take 1 capsule (1,000 Units total) by mouth daily.      Allergies:  Allergies   Allergen Reactions     Oxycodone Itching     Probably Histamine release  Tolerate other narcotics     Tobacco:   reports that he has quit smoking. He quit smokeless tobacco use about 13 years ago. His smokeless tobacco use included Chew.     Physical Exam      /78  Pulse 96  Wt (!) 236 lb (107 kg)  SpO2 98%  BMI 29.5 kg/m2    General Appearance:    Alert, cooperative, no distress   Eyes:    No conjunctival irritation, no scleral icterus       Lungs:     Clear to auscultation bilaterally, respirations unlabored, outpatient of the chest wall does seem to to some degree reproduce the discomfort  that he is feeling although he is uncertain   Heart:    Regular rate and rhythm,no murmur, rub or gallop   Abdomen:     Soft, does report diffuse tenderness on palpation which is chronic.   Extremities:   Extremities normal, atraumatic, no cyanosis or edema   Skin:   Skin color, texture, turgor normal, no rashes or lesions   Neurologic:   Normal strength and sensation

## 2021-06-19 NOTE — LETTER
Letter by Edmond Pemberton MD at      Author: Edmond Pemberton MD Service: -- Author Type: --    Filed:  Encounter Date: 11/5/2019 Status: Signed         Fred Shukla  [address] [facility] [address]             November 5, 2019         Dear Mr. Shukla,    Below are the results from your recent visit:    Resulted Orders   Lipid Cascade   Result Value Ref Range    Cholesterol 142 <=199 mg/dL    Triglycerides 104 <=149 mg/dL    HDL Cholesterol 37 (L) >=40 mg/dL    LDL Calculated 84 <=129 mg/dL    Patient Fasting > 8hrs? Yes    Glucose   Result Value Ref Range    Glucose 68 (L) 70 - 125 mg/dL    Patient Fasting > 8hrs? Yes     Narrative    Fasting Glucose reference range is 70-99 mg/dL per  American Diabetes Association (ADA) guidelines.       The blood sugar is ideal and does not indicate any concern for diabetes.    Overall the cholesterol numbers are very good.    Strive for a healthy balanced diet size.  Follow-up in 1 year to recheck blood sugar and cholesterol.    Please call with questions or contact us using [employer].    Sincerely,        Electronically signed by Edmond Pemberton MD

## 2021-06-19 NOTE — LETTER
Letter by Edmond Pemberton MD at      Author: Edmond Pemberton MD Service: -- Author Type: --    Filed:  Encounter Date: 11/5/2019 Status: Signed         Fred Shukla  [address] [facility] [place] [address]             November 10, 2019         Dear Mr. Shukla,    Below are the results from your recent visit:    Resulted Orders   Lipid Cascade   Result Value Ref Range    Cholesterol 142 <=199 mg/dL    Triglycerides 104 <=149 mg/dL    HDL Cholesterol 37 (L) >=40 mg/dL    LDL Calculated 84 <=129 mg/dL    Patient Fasting > 8hrs? Yes    Glucose   Result Value Ref Range    Glucose 68 (L) 70 - 125 mg/dL    Patient Fasting > 8hrs? Yes     Narrative    Fasting Glucose reference range is 70-99 mg/dL per  American Diabetes Association (ADA) guidelines.   Vitamin D, Total (25-Hydroxy)   Result Value Ref Range    Vitamin D, Total (25-Hydroxy) 25.8 (L) 30.0 - 80.0 ng/mL    Narrative    Deficiency <10.0 ng/mL  Insufficiency 10.0-29.9 ng/mL  Sufficiency 30.0-80.0 ng/mL  Toxicity (possible) >100.0 ng/mL       The previous letter did not include the vitamin D test result.  The vitamin D level is mildly low.  I recommend that you take at least 1000 international units of vitamin D daily.  You can obtain this medication over-the-counter.  I recommend a recheck of your vitamin D level and other labs in 1 year.    Please call with questions or contact us using [email or web address].    Sincerely,        Electronically signed by Edmond Pemberton MD

## 2021-06-21 NOTE — LETTER
Letter by Edmond Pemberton MD at      Author: Edmond Pemberton MD Service: -- Author Type: --    Filed:  Encounter Date: 11/10/2020 Status: (Other)         Fred Shukla  [address] [facility] [place]  [place] [facility] [address]             November 10, 2020         Dear Mr. Shukla,    Below are the results from your recent visit:    Resulted Orders   Vitamin D, Total (25-Hydroxy)   Result Value Ref Range    Vitamin D, Total (25-Hydroxy) 18.9 (L) 30.0 - 80.0 ng/mL    Narrative    Deficiency <10.0 ng/mL  Insufficiency 10.0-29.9 ng/mL  Sufficiency 30.0-80.0 ng/mL  Toxicity (possible) >100.0 ng/mL   Glucose   Result Value Ref Range    Glucose 81 70 - 125 mg/dL    Patient Fasting > 8hrs? Yes     Narrative    Fasting Glucose reference range is 70-99 mg/dL per  American Diabetes Association (ADA) guidelines.   Lipid Cascade   Result Value Ref Range    Cholesterol 159 <=199 mg/dL    Triglycerides 145 <=149 mg/dL    HDL Cholesterol 38 (L) >=40 mg/dL    LDL Calculated 92 <=129 mg/dL    Patient Fasting > 8hrs? Yes        The blood sugar is ideal there is no concern for diabetes.  Overall your cholesterol numbers are good.  The HDL component of the cholesterol is lower than ideal.  Reducing sugar and carbohydrates in your diet along with being more physically active will help improve this component of the cholesterol.    The vitamin D level is low.  Please obtain vitamin D3 from your pharmacy.  This is an over-the-counter medication, you do not need a prescription.  Take 2000 international units every day.  This should be sufficient to increase the level and keep it there, but you will need to take this on an ongoing basis.    Return for recheck of labs in 1 year.    Please call with questions or contact us using [employer].    Sincerely,        Electronically signed by Edmond Pemberton MD

## 2021-06-26 NOTE — PROGRESS NOTES
Progress Notes by Leobardo Fabian MD at 3/29/2018  9:10 AM     Author: Leobardo Fabian MD Service: -- Author Type: Physician    Filed: 3/29/2018  9:28 AM Encounter Date: 3/29/2018 Status: Signed    : Leobardo Fabian MD (Physician)                  [email or web address]  [phone]         Thank you Dr. Laguna for asking the [facility] team to participate in the care of your patient, Fred Shukla.     Impression and Plan     1. Chest discomfort. I agree with Dr. Edmond Pemberton that certain features of Carlos discomfort seemed atypical for cardiac etiology, ischemic or otherwise.  His nuclear perfusion stress study 22 March 2018 was quite reassuring (i.e. normal).    I agree, that certain features of Carlos discomfort sound more musculoskeletal in nature.At this time, do not feel additional cardiac workup is required.  Patient was reassured and comfortable without pursuing additional cardiac workup.           History of Present Illness    Once again I would like to thank you again for asking me to participate in the care of your patient, Fred Shukla.  As you know, but to reiterate for my own records, Fred Shukla is a 47 y.o. male recently evaluated for chest discomfort.  Patient had developed chest discomfort early March 2018.  He presented to the emergency department for evaluation.  Patient continued to have discomfort and followed up with primary provider 9 March 2018.  He was describing a strange sensation in his left anterior chest region.  He had reported chronic aches and pains throughout his upper body.    On further interview, patient describes a ache in a fairly focal region of his left lateral chest.  He reports no significant associated symptoms.  He does report that his symptoms have been gradually improving.    Further review of systems is otherwise negative/noncontributory (medical record and 13 point review of systems reviewed as well and  "pertinent positives noted).         Cardiac Diagnostics      Stress nuclear perfusion study 22 March 2018:  1. No evidence of infarct or ischemia.  2. Normal left ventricular systolic performance with ejection fraction of 70%.    12-lead ECG (personally reviewed) 9 March 2018: Normal sinus rhythm.  Normal ECG.           Physical Examination       /70 (Patient Site: Right Arm, Patient Position: Sitting, Cuff Size: Adult Large)  Pulse 60  Resp 16  Ht 6' 3\" (1.905 m)  Wt (!) 238 lb (108 kg)  BMI 29.75 kg/m2        Wt Readings from Last 3 Encounters:   03/29/18 (!) 238 lb (108 kg)   03/09/18 (!) 236 lb (107 kg)   03/02/18 (!) 228 lb (103.4 kg)     The patient is alert and oriented times three. Sclerae are anicteric. Mucosal membranes are moist. Jugular venous pressure is normal. No significant adenopathy/thyromegally appreciated. Lungs are clear with good expansion. On cardiovascular exam, the patient has a regular S1 and S2. Abdomen is soft and non-tender. Extremities reveal no clubbing, cyanosis, or edema.           Family History/Social History/Risk Factors   Patient does not smoke.  Patient reports no early onset coronary disease in immediate family.  He denies history of hypertension or diabetes mellitus.         Medications  Allergies   Current Outpatient Prescriptions   Medication Sig Dispense Refill   ? cholecalciferol, vitamin D3, (VITAMIN D3) 1,000 unit capsule Take 1 capsule (1,000 Units total) by mouth daily. 90 capsule 3   ? ibuprofen (ADVIL,MOTRIN) 200 MG tablet Take 200 mg by mouth every 6 (six) hours as needed for pain.     ? multivitamin therapeutic tablet Take 1 tablet by mouth daily.     ? naproxen sodium (ALEVE) 220 MG tablet Take 220 mg by mouth 2 (two) times a day with meals. Patient uses rarely prn     ? oxybutynin (DITROPAN) 5 MG tablet Take 5 mg by mouth daily.     ? tamsulosin (FLOMAX) 0.4 mg Cp24 Take 0.4 mg by mouth daily.  11     No current facility-administered medications for " this visit.       Allergies   Allergen Reactions   ? Oxycodone Itching     Probably Histamine release  Tolerate other narcotics          Lab Results   Lab Results   Component Value Date     03/02/2018    K 3.5 03/02/2018     03/02/2018    CO2 27 03/02/2018    BUN 10 03/02/2018    CREATININE 0.81 03/02/2018    CALCIUM 9.2 03/02/2018     Lab Results   Component Value Date    WBC 10.4 03/02/2018    HGB 15.1 03/02/2018    HCT 40.9 03/02/2018    MCV 84 03/02/2018     03/02/2018     Lab Results   Component Value Date    CHOL 146 09/19/2017    TRIG 117 09/19/2017    HDL 34 (L) 09/19/2017    LDLCALC 89 09/19/2017     Lab Results   Component Value Date    INR 1.00 01/19/2012     No results found for: BNP  Lab Results   Component Value Date    CKTOTAL 119 08/13/2015    TROPONINI <0.01 03/02/2018     Lab Results   Component Value Date    TSH 1.11 11/02/2016

## 2021-06-27 ENCOUNTER — HEALTH MAINTENANCE LETTER (OUTPATIENT)
Age: 51
End: 2021-06-27

## 2021-10-22 ENCOUNTER — IMMUNIZATION (OUTPATIENT)
Dept: NURSING | Facility: CLINIC | Age: 51
End: 2021-10-22
Payer: COMMERCIAL

## 2021-10-22 PROCEDURE — 0004A PR COVID VAC PFIZER DIL RECON 30 MCG/0.3 ML IM: CPT

## 2021-10-22 PROCEDURE — 91300 PR COVID VAC PFIZER DIL RECON 30 MCG/0.3 ML IM: CPT

## 2021-11-05 PROBLEM — R39.198 SLOWING OF URINARY STREAM: Status: ACTIVE | Noted: 2018-02-16

## 2021-11-05 RX ORDER — TAMSULOSIN HYDROCHLORIDE 0.4 MG/1
CAPSULE ORAL
COMMUNITY
End: 2021-11-08

## 2021-11-08 ENCOUNTER — OFFICE VISIT (OUTPATIENT)
Dept: FAMILY MEDICINE | Facility: CLINIC | Age: 51
End: 2021-11-08
Payer: COMMERCIAL

## 2021-11-08 VITALS
HEART RATE: 87 BPM | OXYGEN SATURATION: 98 % | WEIGHT: 233.3 LBS | BODY MASS INDEX: 29.01 KG/M2 | SYSTOLIC BLOOD PRESSURE: 126 MMHG | DIASTOLIC BLOOD PRESSURE: 76 MMHG | HEIGHT: 75 IN

## 2021-11-08 DIAGNOSIS — Z00.00 ROUTINE HISTORY AND PHYSICAL EXAMINATION OF ADULT: Primary | ICD-10-CM

## 2021-11-08 DIAGNOSIS — Z12.11 COLON CANCER SCREENING: ICD-10-CM

## 2021-11-08 DIAGNOSIS — R39.15 URINARY URGENCY: ICD-10-CM

## 2021-11-08 DIAGNOSIS — B36.0 TINEA VERSICOLOR: ICD-10-CM

## 2021-11-08 DIAGNOSIS — N48.6 PEYRONIE'S DISEASE: ICD-10-CM

## 2021-11-08 DIAGNOSIS — Q28.3 CONGENITAL ANOMALY OF CEREBROVASCULAR SYSTEM: ICD-10-CM

## 2021-11-08 LAB
CHOLEST SERPL-MCNC: 143 MG/DL
FASTING STATUS PATIENT QL REPORTED: YES
FASTING STATUS PATIENT QL REPORTED: YES
GLUCOSE BLD-MCNC: 89 MG/DL (ref 70–125)
HDLC SERPL-MCNC: 34 MG/DL
LDLC SERPL CALC-MCNC: 76 MG/DL
TRIGL SERPL-MCNC: 163 MG/DL

## 2021-11-08 PROCEDURE — 36415 COLL VENOUS BLD VENIPUNCTURE: CPT | Performed by: FAMILY MEDICINE

## 2021-11-08 PROCEDURE — 99396 PREV VISIT EST AGE 40-64: CPT | Performed by: FAMILY MEDICINE

## 2021-11-08 PROCEDURE — 80061 LIPID PANEL: CPT | Performed by: FAMILY MEDICINE

## 2021-11-08 PROCEDURE — 82947 ASSAY GLUCOSE BLOOD QUANT: CPT | Performed by: FAMILY MEDICINE

## 2021-11-08 SDOH — HEALTH STABILITY: PHYSICAL HEALTH: ON AVERAGE, HOW MANY DAYS PER WEEK DO YOU ENGAGE IN MODERATE TO STRENUOUS EXERCISE (LIKE A BRISK WALK)?: 0 DAYS

## 2021-11-08 SDOH — ECONOMIC STABILITY: INCOME INSECURITY: IN THE LAST 12 MONTHS, WAS THERE A TIME WHEN YOU WERE NOT ABLE TO PAY THE MORTGAGE OR RENT ON TIME?: PATIENT REFUSED

## 2021-11-08 SDOH — ECONOMIC STABILITY: FOOD INSECURITY: WITHIN THE PAST 12 MONTHS, THE FOOD YOU BOUGHT JUST DIDN'T LAST AND YOU DIDN'T HAVE MONEY TO GET MORE.: PATIENT DECLINED

## 2021-11-08 SDOH — ECONOMIC STABILITY: TRANSPORTATION INSECURITY
IN THE PAST 12 MONTHS, HAS LACK OF TRANSPORTATION KEPT YOU FROM MEETINGS, WORK, OR FROM GETTING THINGS NEEDED FOR DAILY LIVING?: PATIENT DECLINED

## 2021-11-08 SDOH — ECONOMIC STABILITY: TRANSPORTATION INSECURITY
IN THE PAST 12 MONTHS, HAS THE LACK OF TRANSPORTATION KEPT YOU FROM MEDICAL APPOINTMENTS OR FROM GETTING MEDICATIONS?: PATIENT DECLINED

## 2021-11-08 SDOH — ECONOMIC STABILITY: INCOME INSECURITY: HOW HARD IS IT FOR YOU TO PAY FOR THE VERY BASICS LIKE FOOD, HOUSING, MEDICAL CARE, AND HEATING?: PATIENT DECLINED

## 2021-11-08 SDOH — HEALTH STABILITY: PHYSICAL HEALTH: ON AVERAGE, HOW MANY MINUTES DO YOU ENGAGE IN EXERCISE AT THIS LEVEL?: 0 MIN

## 2021-11-08 SDOH — ECONOMIC STABILITY: FOOD INSECURITY: WITHIN THE PAST 12 MONTHS, YOU WORRIED THAT YOUR FOOD WOULD RUN OUT BEFORE YOU GOT MONEY TO BUY MORE.: PATIENT DECLINED

## 2021-11-08 ASSESSMENT — SOCIAL DETERMINANTS OF HEALTH (SDOH)
HOW OFTEN DO YOU GET TOGETHER WITH FRIENDS OR RELATIVES?: ONCE A WEEK
IN A TYPICAL WEEK, HOW MANY TIMES DO YOU TALK ON THE PHONE WITH FAMILY, FRIENDS, OR NEIGHBORS?: MORE THAN THREE TIMES A WEEK
HOW OFTEN DO YOU ATTEND CHURCH OR RELIGIOUS SERVICES?: NEVER
ARE YOU MARRIED, WIDOWED, DIVORCED, SEPARATED, NEVER MARRIED, OR LIVING WITH A PARTNER?: NEVER MARRIED
DO YOU BELONG TO ANY CLUBS OR ORGANIZATIONS SUCH AS CHURCH GROUPS UNIONS, FRATERNAL OR ATHLETIC GROUPS, OR SCHOOL GROUPS?: NO

## 2021-11-08 ASSESSMENT — PATIENT HEALTH QUESTIONNAIRE - PHQ9
SUM OF ALL RESPONSES TO PHQ QUESTIONS 1-9: 6
10. IF YOU CHECKED OFF ANY PROBLEMS, HOW DIFFICULT HAVE THESE PROBLEMS MADE IT FOR YOU TO DO YOUR WORK, TAKE CARE OF THINGS AT HOME, OR GET ALONG WITH OTHER PEOPLE: SOMEWHAT DIFFICULT
SUM OF ALL RESPONSES TO PHQ QUESTIONS 1-9: 6

## 2021-11-08 ASSESSMENT — LIFESTYLE VARIABLES
HOW OFTEN DO YOU HAVE A DRINK CONTAINING ALCOHOL: PATIENT DECLINED
HOW MANY STANDARD DRINKS CONTAINING ALCOHOL DO YOU HAVE ON A TYPICAL DAY: PATIENT DECLINED
HOW OFTEN DO YOU HAVE SIX OR MORE DRINKS ON ONE OCCASION: PATIENT DECLINED

## 2021-11-08 ASSESSMENT — MIFFLIN-ST. JEOR: SCORE: 2008.87

## 2021-11-08 ASSESSMENT — ACTIVITIES OF DAILY LIVING (ADL): CURRENT_FUNCTION: NO ASSISTANCE NEEDED

## 2021-11-08 NOTE — PROGRESS NOTES
"Fred Shukla  /76   Pulse 87   Ht 1.905 m (6' 3\")   Wt 105.8 kg (233 lb 4.8 oz)   SpO2 98%   BMI 29.16 kg/m       Assessment/Plan:                Fred was seen today for physical.    Diagnoses and all orders for this visit:    Routine history and physical examination of adult  -     Glucose  -     Lipid panel reflex to direct LDL Fasting    Colon cancer screening  -     Adult Gastro Ref - Procedure Only; Future    Tinea versicolor  -     ketoconazole (NIZORAL) 2 % external cream; Apply topically daily    Congenital anomaly of cerebrovascular system    Peyronie's disease    Urinary urgency         DISCUSSION  Skin rash noted on exam.  Discussed treatment ketoconazole.  Patient will call with pharmacy.  Same labs as above.  Work to improve health by increasing physical activity and improving diet.  Keep up-to-date with routine health prevention.  Subjective:     HPI:    Fred Shukla is a 51 year old male is here today for a physical.  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  He was treated surgically in the past.  He is currently on disability and likely has an underlying cognitive disorder that is not well characterized.  He has a history of urinary symptoms of Peyronie's disease and has seen urology.  Has had work-up as well as medication trials but continues to have difficulty but does not wish to pursue this further at this time.  Has history of depression reports that he does not feel depressed but unfortunately remains somewhat isolated.  Discussed trying to establish better social connections as well as become more physically active.  Reviewed his complex health history and discussed.  Discussed ways he can improve his health and reviewed routine health prevention    ROS:  Complete review of systems is obtained.  Other than the specific considerations noted above complete review of systems is negative.          Objective:   Medications:  Current Outpatient Medications "   Medication     cholecalciferol, vitamin D3, (VITAMIN D3) 25 mcg (1,000 unit) capsule     ketoconazole (NIZORAL) 2 % external cream     No current facility-administered medications for this visit.        Allergies:     Allergies   Allergen Reactions     Oxycodone Itching     Probably Histamine release, Tolerate other narcotics        Social History     Socioeconomic History     Marital status: Single     Spouse name: Not on file     Number of children: 0     Years of education: Not on file     Highest education level: Not on file   Occupational History     Not on file   Tobacco Use     Smoking status: Former Smoker     Smokeless tobacco: Former User     Types: Chew, Chew     Quit date: 4/1/2004     Tobacco comment: Casual   Substance and Sexual Activity     Alcohol use: No     Drug use: No     Comment: Drug use: None since high school     Sexual activity: Not Currently   Other Topics Concern     Not on file   Social History Narrative    Diet- Not healthy, eats irregularly. Wants to reduce soda intake and switch to gluten-free and anti-inflammatory diet. Seeing dietician.    Exercise- Sedentary    Lives alone in town home.  He reports he has assistance from mom and dad with cooking, vitayaneth meza.      Goal weight: 200 lbs.     Social Determinants of Health     Financial Resource Strain: Unknown     Difficulty of Paying Living Expenses: Patient refused   Food Insecurity: Unknown     Worried About Running Out of Food in the Last Year: Patient refused     Ran Out of Food in the Last Year: Patient refused   Transportation Needs: Unknown     Lack of Transportation (Medical): Patient refused     Lack of Transportation (Non-Medical): Patient refused   Physical Activity: Inactive     Days of Exercise per Week: 0 days     Minutes of Exercise per Session: 0 min   Stress: No Stress Concern Present     Feeling of Stress : Not at all   Social Connections: Socially Isolated     Frequency of Communication with Friends and Family:  "More than three times a week     Frequency of Social Gatherings with Friends and Family: Once a week     Attends Buddhism Services: Never     Active Member of Clubs or Organizations: No     Attends Club or Organization Meetings: Not on file     Marital Status: Never    Intimate Partner Violence: Not on file   Housing Stability: Unknown     Unable to Pay for Housing in the Last Year: Patient refused     Number of Places Lived in the Last Year: 1     Unstable Housing in the Last Year: No       Family History   Problem Relation Age of Onset     Snoring Father      Alcoholism Father      No Known Problems Mother      Bipolar Disorder Sister         Most Recent Immunizations   Administered Date(s) Administered     COVID-19,PF,Pfizer (12+ Yrs) 10/22/2021     Flu, Unspecified 10/02/2020     [id number] Novel Flu- Nasal 11/27/2009     HepA, Unspecified 05/27/2010     HepA-Adult 05/27/2010     Influenza (H1N1) 11/27/2009     Influenza (IIV3) PF 10/19/2012     Influenza Vaccine IM > 6 months Valent IIV4 (Alfuria,Fluzone) 10/18/2019     Influenza Vaccine Im 4yrs+ 4 Valent CCIIV4 10/01/2021     Influenza Vaccine, 6+MO IM (QUADRIVALENT W/PRESERVATIVES) 11/02/2016     Influenza vaccine ages 6-35 months 11/02/2016     Influenza,INJ,MDCK,PF,Quad >4yrs 10/02/2020     TDAP Vaccine (Boostrix) 07/23/2020     Td (Adult), Adsorbed 06/26/2007     Td,adult,historic,unspecified 08/05/2011     Tdap (Adacel,Boostrix) 07/23/2020        Wt Readings from Last 3 Encounters:   11/08/21 105.8 kg (233 lb 4.8 oz)   11/06/20 106.1 kg (234 lb)   11/04/19 105.1 kg (231 lb 11.2 oz)        BP Readings from Last 6 Encounters:   11/08/21 126/76   11/04/19 128/80            PHYSICAL EXAM:    /76   Pulse 87   Ht 1.905 m (6' 3\")   Wt 105.8 kg (233 lb 4.8 oz)   SpO2 98%   BMI 29.16 kg/m           General Appearance:    Alert, cooperative, no distress   Eyes:   No scleral icterus or conjunctival irritation       Ears:    Normal TM's and external " "ear canals, both ears   Throat:   Lips, mucosa, and tongue normal; teeth and gums normal   Neck:   Supple, symmetrical, trachea midline, no adenopathy;        thyroid:  No enlargement/tenderness/nodules   Lungs:     Clear to auscultation bilaterally, respirations unlabored, no wheezes or crackles   Heart:    Regular rate and rhythm,  No murmur   Abdomen:    Soft, no distention, no tenderness on palpation, no masses, no organomegaly     Extremities:  No edema, no joint swelling or redness, no evidence of any injuries   Skin:  He has findings of tinea versicolor develop in the upper chest and neck region.  No other concerning skin lesions noted   Neurologic:  On gross examination there is no motor or sensory deficit.  Patient walks with a normal gait                                                 Answers for HPI/ROS submitted by the patient on 11/8/2021  If you checked off any problems, how difficult have these problems made it for you to do your work, take care of things at home, or get along with other people?: Somewhat difficult  PHQ9 TOTAL SCORE: 6  In general, how would you rate your overall physical health?: poor  Frequency of exercise:: None  Do you usually eat at least 4 servings of fruit and vegetables a day, include whole grains & fiber, and avoid regularly eating high fat or \"junk\" foods? : No  Taking medications regularly:: Not Applicable  Medication side effects:: Not applicable  Activities of Daily Living: no assistance needed  Home safety: no safety concerns identified  Hearing Impairment:: no hearing concerns  In the past 6 months, have you been bothered by leaking of urine?: No  In general, how would you rate your overall mental or emotional health?: poor  Additional concerns today:: No      "

## 2021-11-09 ASSESSMENT — PATIENT HEALTH QUESTIONNAIRE - PHQ9: SUM OF ALL RESPONSES TO PHQ QUESTIONS 1-9: 6

## 2021-11-14 RX ORDER — KETOCONAZOLE 20 MG/G
CREAM TOPICAL DAILY
Qty: 60 G | Refills: 1 | Status: SHIPPED | OUTPATIENT
Start: 2021-11-14 | End: 2022-11-15

## 2022-01-18 ENCOUNTER — TRANSFERRED RECORDS (OUTPATIENT)
Dept: HEALTH INFORMATION MANAGEMENT | Facility: CLINIC | Age: 52
End: 2022-01-18
Payer: COMMERCIAL

## 2022-02-07 ENCOUNTER — OFFICE VISIT (OUTPATIENT)
Dept: FAMILY MEDICINE | Facility: CLINIC | Age: 52
End: 2022-02-07
Payer: COMMERCIAL

## 2022-02-07 VITALS
DIASTOLIC BLOOD PRESSURE: 72 MMHG | OXYGEN SATURATION: 95 % | SYSTOLIC BLOOD PRESSURE: 126 MMHG | BODY MASS INDEX: 29.62 KG/M2 | WEIGHT: 237 LBS | HEART RATE: 103 BPM

## 2022-02-07 DIAGNOSIS — M77.11 LATERAL EPICONDYLITIS OF RIGHT ELBOW: Primary | ICD-10-CM

## 2022-02-07 PROCEDURE — 99213 OFFICE O/P EST LOW 20 MIN: CPT | Performed by: FAMILY MEDICINE

## 2022-02-07 NOTE — PROGRESS NOTES
ASSESSMENT/PLAN:  Fred was seen today for pain.    Diagnoses and all orders for this visit:    Lateral epicondylitis of right elbow  -     diclofenac (VOLTAREN) 1 % topical gel; Apply 4 g topically 4 times daily  OTC analgesics  Elbow brace    Mild lateral epicondylitis of left elbow  voltaren prn      There are no Patient Instructions on file for this visit.    SUBJECTIVE:    Fred Shukla is a 51 year old male who came in today     2-3 months of pain of right elbow  Pain with picking up grocery bags  Pain with pouring milk jug  Shadow boxing (air boxing) at time but no known injury or direct trauma  No swelling, redness, bruising  He is left hand dominant  Not currently working  Not on computer for prolonged periods of time  No hobby  No sports  Sleeps on his sides    Review of Systems (except those mentioned above)  Constitutional: Negative.   HENT: Negative.   Eyes: Negative.   Respiratory: Negative.   Cardiovascular: Negative.   Gastrointestinal: Negative.   Endocrine: Negative.   Genitourinary: Negative.   Musculoskeletal: Negative.   Skin: Negative.   Allergic/Immunologic: Negative.   Neurological: Negative.   Hematological: Negative.   Psychiatric/Behavioral: Negative.     Patient Active Problem List    Diagnosis Date Noted     Slowing of urinary stream 02/16/2018     Priority: Medium     Chronic pain 11/02/2016     Priority: Medium     Began around 2014  Low back pain,   Tried:  Chiro, spine center, branch block, injections  [facility]         Obesity 11/02/2016     Priority: Medium     BMI over 30  Goal 200  Has been gaining wt         Cerebral Arteriovenous Malformation      Priority: Medium     Surgery 2012         Pelvic and perineal pain 09/28/2016     Priority: Medium     Peyronie's disease 05/05/2016     Priority: Medium     [facility]  Injections         Disorder of penis 04/13/2016     Priority: Medium     Chronic mental illness 01/19/2016     Priority: Medium     Unsure of  Dx  Symptoms:  Distracted, low energy  Psychiatrist, psychology         Allergies   Allergen Reactions     Oxycodone Itching     Probably Histamine release, Tolerate other narcotics     Current Outpatient Medications   Medication Sig Dispense Refill     diclofenac (VOLTAREN) 1 % topical gel Apply 4 g topically 4 times daily 350 g 1     cholecalciferol, vitamin D3, (VITAMIN D3) 25 mcg (1,000 unit) capsule [CHOLECALCIFEROL, VITAMIN D3, (VITAMIN D3) 25 MCG (1,000 UNIT) CAPSULE] Take 1 capsule (1,000 Units total) by mouth daily. (Patient not taking: Reported on 2/7/2022) 90 capsule 3     ketoconazole (NIZORAL) 2 % external cream Apply topically daily (Patient not taking: Reported on 2/7/2022) 60 g 1     Past Medical History:   Diagnosis Date     AVM (arteriovenous malformation)      Chlamydia 2004     Past Surgical History:   Procedure Laterality Date     ANKLE SURGERY Right 05/06/2016    Peroneal Subluxation of Tendons. Dr. Ramires     CRANIOTOMY FOR AVM  01/19/2012    Headaches     IR CAROTID ANGIOGRAM  1/5/2012     IR CAROTID ANGIOGRAM  1/5/2012     IR CAROTID ANGIOGRAM  1/5/2012     IR CAROTID ANGIOGRAM  1/19/2012     IR CAROTID ANGIOGRAM  1/19/2012     IR MISCELLANEOUS PROCEDURE  1/5/2012     IR MISCELLANEOUS PROCEDURE  1/5/2012     IR MISCELLANEOUS PROCEDURE  1/5/2012     IR MISCELLANEOUS PROCEDURE  1/17/2012     IR MISCELLANEOUS PROCEDURE  1/17/2012     IR MISCELLANEOUS PROCEDURE  1/17/2012     IR MISCELLANEOUS PROCEDURE  1/17/2012     IR MISCELLANEOUS PROCEDURE  1/17/2012     IR MISCELLANEOUS PROCEDURE  1/17/2012     IR MISCELLANEOUS PROCEDURE  1/19/2012     IR MISCELLANEOUS PROCEDURE  1/19/2012     Social History     Socioeconomic History     Marital status: Single     Spouse name: None     Number of children: 0     Years of education: None     Highest education level: None   Occupational History     None   Tobacco Use     Smoking status: Former Smoker     Smokeless tobacco: Former User     Types: Chew, Chew      Quit date: 4/1/2004     Tobacco comment: Casual   Substance and Sexual Activity     Alcohol use: No     Drug use: No     Comment: Drug use: None since high school     Sexual activity: Not Currently   Other Topics Concern     None   Social History Narrative    Diet- Not healthy, eats irregularly. Wants to reduce soda intake and switch to gluten-free and anti-inflammatory diet. Seeing dietician.    Exercise- Sedentary    Lives alone in town home.  He reports he has assistance from mom and dad with cooking, vita meza.      Goal weight: 200 lbs.     Social Determinants of Health     Financial Resource Strain: Unknown     Difficulty of Paying Living Expenses: Patient refused   Food Insecurity: Unknown     Worried About Running Out of Food in the Last Year: Patient refused     Ran Out of Food in the Last Year: Patient refused   Transportation Needs: Unknown     Lack of Transportation (Medical): Patient refused     Lack of Transportation (Non-Medical): Patient refused   Physical Activity: Inactive     Days of Exercise per Week: 0 days     Minutes of Exercise per Session: 0 min   Stress: No Stress Concern Present     Feeling of Stress : Not at all   Social Connections: Socially Isolated     Frequency of Communication with Friends and Family: More than three times a week     Frequency of Social Gatherings with Friends and Family: Once a week     Attends Yazidi Services: Never     Active Member of Clubs or Organizations: No     Attends Club or Organization Meetings: Not on file     Marital Status: Never    Intimate Partner Violence: Not on file   Housing Stability: Unknown     Unable to Pay for Housing in the Last Year: Patient refused     Number of Places Lived in the Last Year: 1     Unstable Housing in the Last Year: No     Family History   Problem Relation Age of Onset     Snoring Father      Alcoholism Father      No Known Problems Mother      Bipolar Disorder Sister          OBJECTIVE:    Vitals:    02/07/22  1329   BP: 126/72   BP Location: Left arm   Patient Position: Sitting   Cuff Size: Adult Large   Pulse: 103   SpO2: 95%   Weight: 107.5 kg (237 lb)     Body mass index is 29.62 kg/m .    Physical Exam:  Constitutional: Patient is oriented to person, place, and time. Patient appears well-developed and well-nourished. No distress.   Head: Normocephalic and atraumatic.   Right Ear: External ear normal.   Left Ear: External ear normal.   Eyes: Conjunctivae and EOM are normal. Right eye exhibits no discharge. Left eye exhibits no discharge. No scleral icterus.   Neurological: Patient is alert and oriented to person, place, and time.  Skin: No rash noted. Patient is not diaphoretic. No erythema. No pallor.  Right elbow: no swelling, redness, bruise, or deformity. full range of motion.  Tenderness to palpation of lateral epicondyle  Left elbow:  no swelling, redness, bruise, or deformity. full range of motion.  Tenderness to palpation of lateral epicondyle

## 2022-08-01 ENCOUNTER — IMMUNIZATION (OUTPATIENT)
Dept: NURSING | Facility: CLINIC | Age: 52
End: 2022-08-01
Payer: COMMERCIAL

## 2022-08-01 PROCEDURE — 91305 COVID-19,PF,PFIZER (12+ YRS): CPT

## 2022-08-01 PROCEDURE — 0054A COVID-19,PF,PFIZER (12+ YRS): CPT

## 2022-09-29 ENCOUNTER — IMMUNIZATION (OUTPATIENT)
Dept: NURSING | Facility: CLINIC | Age: 52
End: 2022-09-29
Payer: COMMERCIAL

## 2022-09-29 ENCOUNTER — NURSE TRIAGE (OUTPATIENT)
Dept: NURSING | Facility: CLINIC | Age: 52
End: 2022-09-29

## 2022-09-29 PROCEDURE — G0008 ADMIN INFLUENZA VIRUS VAC: HCPCS

## 2022-09-29 PROCEDURE — 0124A COVID-19,PF,PFIZER BOOSTER BIVALENT: CPT

## 2022-09-29 PROCEDURE — 90682 RIV4 VACC RECOMBINANT DNA IM: CPT

## 2022-09-29 PROCEDURE — 91312 COVID-19,PF,PFIZER BOOSTER BIVALENT: CPT

## 2022-09-29 NOTE — TELEPHONE ENCOUNTER
Patient scheduled his booster for Covid.   He is wondering if he should go in today.  [facility] information reviewed with patient.    Patient will go in today to get shot.    Chiquis Light RN on 9/29/2022 at 1:01 PM    Reason for Disposition    Information only question and nurse able to answer    Additional Information    Negative: Nursing judgment    Negative: Nursing judgment    Negative: Nursing judgment    Negative: Nursing judgment    Protocols used: INFORMATION ONLY CALL - NO TRIAGE-A-[place]

## 2022-11-15 ENCOUNTER — OFFICE VISIT (OUTPATIENT)
Dept: FAMILY MEDICINE | Facility: CLINIC | Age: 52
End: 2022-11-15
Payer: COMMERCIAL

## 2022-11-15 VITALS
DIASTOLIC BLOOD PRESSURE: 78 MMHG | HEIGHT: 75 IN | BODY MASS INDEX: 29.09 KG/M2 | WEIGHT: 234 LBS | SYSTOLIC BLOOD PRESSURE: 128 MMHG | RESPIRATION RATE: 18 BRPM | HEART RATE: 93 BPM

## 2022-11-15 DIAGNOSIS — Z00.00 ROUTINE HISTORY AND PHYSICAL EXAMINATION OF ADULT: Primary | ICD-10-CM

## 2022-11-15 DIAGNOSIS — E55.9 VITAMIN D DEFICIENCY: ICD-10-CM

## 2022-11-15 LAB
CHOLEST SERPL-MCNC: 134 MG/DL
FASTING STATUS PATIENT QL REPORTED: YES
GLUCOSE SERPL-MCNC: 88 MG/DL (ref 70–99)
HDLC SERPL-MCNC: 35 MG/DL
HOLD SPECIMEN: NORMAL
LDLC SERPL CALC-MCNC: 74 MG/DL
NONHDLC SERPL-MCNC: 99 MG/DL
TRIGL SERPL-MCNC: 127 MG/DL

## 2022-11-15 PROCEDURE — 82947 ASSAY GLUCOSE BLOOD QUANT: CPT | Performed by: FAMILY MEDICINE

## 2022-11-15 PROCEDURE — 82306 VITAMIN D 25 HYDROXY: CPT | Performed by: FAMILY MEDICINE

## 2022-11-15 PROCEDURE — G0439 PPPS, SUBSEQ VISIT: HCPCS | Performed by: FAMILY MEDICINE

## 2022-11-15 PROCEDURE — 36415 COLL VENOUS BLD VENIPUNCTURE: CPT | Performed by: FAMILY MEDICINE

## 2022-11-15 PROCEDURE — 80061 LIPID PANEL: CPT | Performed by: FAMILY MEDICINE

## 2022-11-15 ASSESSMENT — ENCOUNTER SYMPTOMS
ABDOMINAL PAIN: 0
HEARTBURN: 0
HEMATOCHEZIA: 0
PALPITATIONS: 0
HEMATURIA: 0
ARTHRALGIAS: 0
MYALGIAS: 0
PARESTHESIAS: 0
WEAKNESS: 0
CONSTIPATION: 0
DYSURIA: 0
JOINT SWELLING: 0
HEADACHES: 0
CHILLS: 0
DIARRHEA: 0
NERVOUS/ANXIOUS: 0
EYE PAIN: 0
FEVER: 0
COUGH: 0
SORE THROAT: 0
SHORTNESS OF BREATH: 0
FREQUENCY: 0
DIZZINESS: 0
NAUSEA: 0

## 2022-11-15 ASSESSMENT — PATIENT HEALTH QUESTIONNAIRE - PHQ9
SUM OF ALL RESPONSES TO PHQ QUESTIONS 1-9: 11
SUM OF ALL RESPONSES TO PHQ QUESTIONS 1-9: 11

## 2022-11-15 ASSESSMENT — PAIN SCALES - GENERAL: PAINLEVEL: NO PAIN (0)

## 2022-11-15 ASSESSMENT — ACTIVITIES OF DAILY LIVING (ADL): CURRENT_FUNCTION: NO ASSISTANCE NEEDED

## 2022-11-15 NOTE — PROGRESS NOTES
"SUBJECTIVE:   Gallo is a 52 year old who presents for Preventive Visit.    Is a history of arteriovenous malformation in the brain that required surgical treatment.  He follows up about every 5 years with imaging his last imaging was in 2019 showing no significant problem.  He currently has disability likely an underlying cognitive disorder that is not well characterized.  He has a history of urinary symptoms and of Nelly's disease.  He has been seen by urology but has no concerns currently and has not had recent follow-up.  He has a past history of mental health concerns but does not report any concerns at this time although his PHQ-9 score is elevated.  Today we discussed considerations related to exercise and nutrition.  He has some minor skin concerns consistent with keratosis pilaris as well as ongoing tinea versicolor.    This past year he had colon cancer screening with a 7-year follow-up, he will be due in January 2029.    He has history of mild cholesterol elevation.  Discussed recheck of cholesterol.      Patient has been advised of split billing requirements and indicates understanding: Yes  Are you in the first 12 months of your Medicare coverage?  No    Do you feel safe in your environment? Yes    Have you ever done Advance Care Planning? (For example, a Health Directive, POLST, or a discussion with a medical provider or your loved ones about your wishes):       Cognitive Screening: Not Applicable    Do you have sleep apnea, excessive snoring or daytime drowsiness?: no    Reviewed and updated as needed this visit by clinical staff  Answers for HPI/ROS submitted by the patient on 11/15/2022  PHQ9 TOTAL SCORE: 11  In general, how would you rate your overall physical health?: poor  Frequency of exercise:: None  Do you usually eat at least 4 servings of fruit and vegetables a day, include whole grains & fiber, and avoid regularly eating high fat or \"junk\" foods? : No  Taking medications regularly:: Not " Applicable  Medication side effects:: Not applicable  Activities of Daily Living: no assistance needed  Home safety: no safety concerns identified  Hearing Impairment:: no hearing concerns  In the past 6 months, have you been bothered by leaking of urine?: No  In general, how would you rate your overall mental or emotional health?: poor  Additional concerns today:: No      Reviewed and updated as needed this visit by Provider    Social History     Tobacco Use     Smoking status: Former     Smokeless tobacco: Former     Types: Chew     Quit date: 4/1/2004     Tobacco comments:     Casual   Substance Use Topics     Alcohol use: No     If you drink alcohol do you typically have >3 drinks per day or >7 drinks per week? No    Alcohol Use 11/15/2022   Prescreen: >3 drinks/day or >7 drinks/week? No   No flowsheet data found.        Current providers sharing in care for this patient include:   Patient Care Team:  Edmond Pemberton MD as PCP - General  Edmond Pemberton MD as Assigned PCP    The following health maintenance items are reviewed in Epic and correct as of today:  Health Maintenance   Topic Date Due     ANNUAL REVIEW OF HM ORDERS  Never done     ADVANCE CARE PLANNING  Never done     HEPATITIS B IMMUNIZATION (1 of 3 - 3-dose series) Never done     MEDICARE ANNUAL WELLNESS VISIT  11/08/2022     ZOSTER IMMUNIZATION (2 of 2) 12/01/2022     LIPID  11/08/2026     DTAP/TDAP/TD IMMUNIZATION (2 - Td or Tdap) 07/23/2030     COLORECTAL CANCER SCREENING  01/18/2032     HEPATITIS C SCREENING  Completed     HIV SCREENING  Completed     PHQ-2 (once per calendar year)  Completed     INFLUENZA VACCINE  Completed     COVID-19 Vaccine  Completed     Pneumococcal Vaccine: Pediatrics (0 to 5 Years) and At-Risk Patients (6 to 64 Years)  Aged Out     IPV IMMUNIZATION  Aged Out     MENINGITIS IMMUNIZATION  Aged Out               Review of Systems  Complete review of systems is obtained.  Other than the specific considerations noted  "above complete review of systems is negative.      OBJECTIVE:   /78   Pulse 93   Resp 18   Ht 1.905 m (6' 3\")   Wt 106.1 kg (234 lb)   BMI 29.25 kg/m   Estimated body mass index is 29.25 kg/m  as calculated from the following:    Height as of this encounter: 1.905 m (6' 3\").    Weight as of this encounter: 106.1 kg (234 lb).  Physical Exam        General Appearance:    Alert, cooperative, no distress   Eyes:   No scleral icterus or conjunctival irritation       Ears:    Normal TM's and external ear canals, both ears   Throat:   Lips, mucosa, and tongue normal; teeth and gums normal   Neck:   Supple, symmetrical, trachea midline, no adenopathy;        thyroid:  No enlargement/tenderness/nodules   Lungs:     Clear to auscultation bilaterally, respirations unlabored, no wheezes or crackles   Heart:    Regular rate and rhythm,  No murmur   Abdomen:    Soft, no distention, no tenderness on palpation, no masses, no organomegaly     Extremities:  No edema, no joint swelling or redness, no evidence of any injuries   Skin:  No concerning skin findings, no suspicious moles, no rashes   Neurologic:  On gross examination there is no motor or sensory deficit.  Patient walks with a normal gait       ASSESSMENT / PLAN:   Fred was seen today for physical.    Diagnoses and all orders for this visit:    Routine history and physical examination of adult  -     Lipid panel reflex to direct LDL Fasting  -     Glucose  -     Extra Tube; Future  -     Extra Tube    Vitamin D deficiency  -     Vitamin D Deficiency  -     Extra Tube; Future  -     Extra Tube           Patient has been advised of split billing requirements and indicates understanding: Yes      COUNSELING:  Reviewed preventive health counseling, as reflected in patient instructions       Regular exercise       Healthy diet/nutrition       Vision screening       Dental care       Colon cancer screening    Estimated body mass index is 29.25 kg/m  as calculated " "from the following:    Height as of this encounter: 1.905 m (6' 3\").    Weight as of this encounter: 106.1 kg (234 lb).    Weight management plan: Discussed healthy diet and exercise guidelines    He reports that he has quit smoking. He quit smokeless tobacco use about 18 years ago.  His smokeless tobacco use included chew and chew.      Appropriate preventive services were discussed with this patient, including applicable screening as appropriate for cardiovascular disease, diabetes, osteopenia/osteoporosis, and glaucoma.  As appropriate for age/gender, discussed screening for colorectal cancer, prostate cancer, breast cancer, and cervical cancer. Checklist reviewing preventive services available has been given to the patient.    Reviewed patients plan of care and provided an AVS. The Basic Care Plan (routine screening as documented in Health Maintenance) for Fred meets the Care Plan requirement. This Care Plan has been established and reviewed with the Patient.    Counseling Resources:  ATP IV Guidelines  Pooled Cohorts Equation Calculator  Breast Cancer Risk Calculator  Breast Cancer: Medication to Reduce Risk  FRAX Risk Assessment  ICSI Preventive Guidelines  Dietary Guidelines for Americans, 2010  USDA's MyPlate  ASA Prophylaxis  Lung CA Screening    Edmond Pemberton MD, MD  [facility]    Identified Health Risks:    Wt Readings from Last 3 Encounters:   11/15/22 106.1 kg (234 lb)   02/07/22 107.5 kg (237 lb)   11/08/21 105.8 kg (233 lb 4.8 oz)        BP Readings from Last 6 Encounters:   11/15/22 128/78   02/07/22 126/72   11/08/21 126/76   11/04/19 128/80                                        "

## 2022-11-16 LAB — DEPRECATED CALCIDIOL+CALCIFEROL SERPL-MC: 32 UG/L (ref 20–75)

## 2023-08-17 ENCOUNTER — MYC MEDICAL ADVICE (OUTPATIENT)
Dept: FAMILY MEDICINE | Facility: CLINIC | Age: 53
End: 2023-08-17
Payer: COMMERCIAL

## 2023-08-17 DIAGNOSIS — Z23 NEED FOR PROPHYLACTIC VACCINATION AGAINST HEPATITIS B VIRUS: ICD-10-CM

## 2023-09-14 ENCOUNTER — ALLIED HEALTH/NURSE VISIT (OUTPATIENT)
Dept: FAMILY MEDICINE | Facility: CLINIC | Age: 53
End: 2023-09-14
Payer: COMMERCIAL

## 2023-09-14 DIAGNOSIS — Z23 IMMUNIZATION DUE: Primary | ICD-10-CM

## 2023-09-14 PROCEDURE — 99207 PR NO CHARGE NURSE ONLY: CPT

## 2023-09-14 PROCEDURE — G0010 ADMIN HEPATITIS B VACCINE: HCPCS

## 2023-09-14 PROCEDURE — 90746 HEPB VACCINE 3 DOSE ADULT IM: CPT

## 2023-09-27 ENCOUNTER — IMMUNIZATION (OUTPATIENT)
Dept: NURSING | Facility: CLINIC | Age: 53
End: 2023-09-27
Payer: COMMERCIAL

## 2023-09-27 PROCEDURE — 90480 ADMN SARSCOV2 VAC 1/ONLY CMP: CPT

## 2023-09-27 PROCEDURE — 91320 SARSCV2 VAC 30MCG TRS-SUC IM: CPT

## 2023-11-30 RX ORDER — TAMSULOSIN HYDROCHLORIDE 0.4 MG/1
0.4 CAPSULE ORAL DAILY
COMMUNITY
End: 2023-12-04

## 2023-12-04 ENCOUNTER — OFFICE VISIT (OUTPATIENT)
Dept: FAMILY MEDICINE | Facility: CLINIC | Age: 53
End: 2023-12-04
Payer: MEDICAID

## 2023-12-04 VITALS
WEIGHT: 235.5 LBS | SYSTOLIC BLOOD PRESSURE: 132 MMHG | RESPIRATION RATE: 18 BRPM | OXYGEN SATURATION: 98 % | TEMPERATURE: 98 F | HEIGHT: 75 IN | HEART RATE: 86 BPM | BODY MASS INDEX: 29.28 KG/M2 | DIASTOLIC BLOOD PRESSURE: 76 MMHG

## 2023-12-04 DIAGNOSIS — E55.9 VITAMIN D DEFICIENCY: ICD-10-CM

## 2023-12-04 DIAGNOSIS — Z00.00 ROUTINE HISTORY AND PHYSICAL EXAMINATION OF ADULT: Primary | ICD-10-CM

## 2023-12-04 LAB
CHOLEST SERPL-MCNC: 133 MG/DL
FASTING STATUS PATIENT QL REPORTED: YES
GLUCOSE SERPL-MCNC: 89 MG/DL (ref 70–99)
HDLC SERPL-MCNC: 34 MG/DL
LDLC SERPL CALC-MCNC: 78 MG/DL
NONHDLC SERPL-MCNC: 99 MG/DL
TRIGL SERPL-MCNC: 106 MG/DL
VIT D+METAB SERPL-MCNC: 38 NG/ML (ref 20–50)

## 2023-12-04 PROCEDURE — 36415 COLL VENOUS BLD VENIPUNCTURE: CPT | Performed by: FAMILY MEDICINE

## 2023-12-04 PROCEDURE — 80061 LIPID PANEL: CPT | Performed by: FAMILY MEDICINE

## 2023-12-04 PROCEDURE — G0439 PPPS, SUBSEQ VISIT: HCPCS | Performed by: FAMILY MEDICINE

## 2023-12-04 PROCEDURE — 82947 ASSAY GLUCOSE BLOOD QUANT: CPT | Performed by: FAMILY MEDICINE

## 2023-12-04 PROCEDURE — 82306 VITAMIN D 25 HYDROXY: CPT | Performed by: FAMILY MEDICINE

## 2023-12-04 PROCEDURE — G0010 ADMIN HEPATITIS B VACCINE: HCPCS | Performed by: FAMILY MEDICINE

## 2023-12-04 PROCEDURE — 90746 HEPB VACCINE 3 DOSE ADULT IM: CPT | Performed by: FAMILY MEDICINE

## 2023-12-04 ASSESSMENT — ENCOUNTER SYMPTOMS
DIARRHEA: 0
JOINT SWELLING: 0
CONSTIPATION: 0
FREQUENCY: 0
WEAKNESS: 0
FEVER: 0
COUGH: 0
HEADACHES: 0
CHILLS: 0
DIZZINESS: 0
SHORTNESS OF BREATH: 0
PALPITATIONS: 0
HEMATOCHEZIA: 0
DYSURIA: 0
HEMATURIA: 0
ABDOMINAL PAIN: 0
PARESTHESIAS: 0
HEARTBURN: 0
NERVOUS/ANXIOUS: 0
SORE THROAT: 0
NAUSEA: 0
MYALGIAS: 0
ARTHRALGIAS: 0
EYE PAIN: 0

## 2023-12-04 ASSESSMENT — PAIN SCALES - GENERAL: PAINLEVEL: NO PAIN (0)

## 2023-12-04 ASSESSMENT — PATIENT HEALTH QUESTIONNAIRE - PHQ9
SUM OF ALL RESPONSES TO PHQ QUESTIONS 1-9: 10
SUM OF ALL RESPONSES TO PHQ QUESTIONS 1-9: 10
10. IF YOU CHECKED OFF ANY PROBLEMS, HOW DIFFICULT HAVE THESE PROBLEMS MADE IT FOR YOU TO DO YOUR WORK, TAKE CARE OF THINGS AT HOME, OR GET ALONG WITH OTHER PEOPLE: VERY DIFFICULT

## 2023-12-04 ASSESSMENT — ACTIVITIES OF DAILY LIVING (ADL): CURRENT_FUNCTION: NO ASSISTANCE NEEDED

## 2023-12-04 NOTE — PROGRESS NOTES
SUBJECTIVE:   Gallo is a 53 year old, presenting for the following:  Physical and Recheck Medication    Are you in the first 12 months of your Medicare coverage?  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  He follows up every 5 years with imaging with his last imaging being in 2019 showing no problems.    He has a history of depression that was treated with medication in the past, he has some ongoing depression symptoms which she acknowledges but feels he does not desire or need medication therapy we spent time today discussing mental health.  We discussed trying to overcome his relative isolation and be more physically active.    We spoke about nutrition.    We talked about routine health preventive measures including colon cancer screening, immunizations, regular dental care and eye care.        Today's PHQ-9 Score:       12/4/2023     2:03 PM   PHQ-9 SCORE   PHQ-9 Total Score Br[place] 10 (Moderate depression)   PHQ-9 Total Score 10       Fall Risk Assessment     Fallen 2 or more times in the past year? -- -- No   Any fall with injury in the past year? -- -- No       Have you ever done Advance Care Planning? (For example, a Health Directive, POLST, or a discussion with a medical provider or your loved ones about your wishes): Yes, advance care planning is on file.    Cognitive Screening: performed  1) Repeat 3 items : 3/3   2) Clock draw: NORMAL  3) 3 item recall: Recalls 2 objects   Results: NORMAL clock, 1-2 items recalled: COGNITIVE IMPAIRMENT LESS LIKELY    Mini-CogTM Copyright BRYANT Rivera. Licensed by the author for use in [facility]; reprinted with permission (yun@.[facility]). All rights reserved.      Do you have sleep apnea, excessive snoring or daytime drowsiness? : no    Reviewed and updated as needed this visit by clinical staff    Reviewed and updated as needed this visit by Provider    Social History     Tobacco Use     Smoking  "status: Former     Smokeless tobacco: Former     Types: Chew     Quit date: 4/1/2004     Tobacco comments:     Casual   Substance Use Topics     Alcohol use: No             12/4/2023     1:55 PM   Alcohol Use   Prescreen: >3 drinks/day or >7 drinks/week? No          No data to display              Do you have a current opioid prescription? No  Do you use any other controlled substances or medications that are not prescribed by a provider? None    Current providers sharing in care for this patient include:   Patient Care Team:  Edmond Pemberton MD as PCP - General (Family Medicine)  Edmond Pemberton MD as Assigned PCP    The following health maintenance items are reviewed in Epic and correct as of today:  Health Maintenance   Topic Date Due     ANNUAL REVIEW OF  ORDERS  Never done     ADVANCE CARE PLANNING  Never done     LUNG CANCER SCREENING  Never done     HEPATITIS B IMMUNIZATION (2 of 3 - 19+ 3-dose series) 10/12/2023     MEDICARE ANNUAL WELLNESS VISIT  11/15/2023     LIPID  11/15/2027     DTAP/TDAP/TD IMMUNIZATION (5 - Td or Tdap) 07/23/2030     COLORECTAL CANCER SCREENING  01/18/2032     HEPATITIS C SCREENING  Completed     HIV SCREENING  Completed     PHQ-2 (once per calendar year)  Completed     INFLUENZA VACCINE  Completed     ZOSTER IMMUNIZATION  Completed     COVID-19 Vaccine  Completed     Pneumococcal Vaccine: Pediatrics (0 to 5 Years) and At-Risk Patients (6 to 64 Years)  Aged Out     IPV IMMUNIZATION  Aged Out     HPV IMMUNIZATION  Aged Out     MENINGITIS IMMUNIZATION  Aged Out     RSV MONOCLONAL ANTIBODY  Aged Out             Review of Systems  Complete review of systems is obtained.  Other than the specific considerations noted above complete review of systems is negative.      OBJECTIVE:   /76   Pulse 86   Temp 98  F (36.7  C)   Resp 18   Ht 1.905 m (6' 3\")   Wt 106.8 kg (235 lb 8 oz)   SpO2 98%   BMI 29.44 kg/m   Estimated body mass index is 29.44 kg/m  as calculated from the " "following:    Height as of this encounter: 1.905 m (6' 3\").    Weight as of this encounter: 106.8 kg (235 lb 8 oz).  Physical Exam        General Appearance:    Alert, cooperative, no distress   Eyes:   No scleral icterus or conjunctival irritation       Ears:    Normal TM's and external ear canals, both ears   Throat:   Lips, mucosa, and tongue normal; teeth and gums normal   Neck:   Supple, symmetrical, trachea midline, no adenopathy;        thyroid:  No enlargement/tenderness/nodules   Lungs:     Clear to auscultation bilaterally, respirations unlabored, no wheezes or crackles   Heart:    Regular rate and rhythm,  No murmur   Abdomen:    Soft, no distention, no tenderness on palpation, no masses, no organomegaly     Extremities:  No edema, no joint swelling or redness, no evidence of any injuries   Skin:  No concerning skin findings, no suspicious moles, no rashes   Neurologic:  On gross examination there is no motor or sensory deficit.  Patient walks with a normal gait                 ASSESSMENT / PLAN:   Gallo was seen today for physical and recheck medication.    Diagnoses and all orders for this visit:    Routine history and physical examination of adult    Vitamin D deficiency           Patient has been advised of split billing requirements and indicates understanding: Yes    Depression Screening Follow Up        12/4/2023     2:03 PM   PHQ   PHQ-9 Total Score 10   Q9: Thoughts of better off dead/self-harm past 2 weeks Not at all         12/4/2023     2:03 PM   Last PHQ-9   1.  Little interest or pleasure in doing things 1   2.  Feeling down, depressed, or hopeless 1   3.  Trouble falling or staying asleep, or sleeping too much 1   4.  Feeling tired or having little energy 3   5.  Poor appetite or overeating 2   6.  Feeling bad about yourself 1   7.  Trouble concentrating 1   8.  Moving slowly or restless 0   Q9: Thoughts of better off dead/self-harm past 2 weeks 0   PHQ-9 Total Score 10     Follow Up Actions " "Taken  Patient counseled, no additional follow up at this time.     COUNSELING:  Reviewed preventive health counseling, as reflected in patient instructions       Regular exercise       Healthy diet/nutrition       Vision screening       Dental care       Colon cancer screening      BMI:   Estimated body mass index is 29.44 kg/m  as calculated from the following:    Height as of this encounter: 1.905 m (6' 3\").    Weight as of this encounter: 106.8 kg (235 lb 8 oz).   Weight management plan: Discussed healthy diet and exercise guidelines    Wt Readings from Last 3 Encounters:   12/04/23 106.8 kg (235 lb 8 oz)   11/15/22 106.1 kg (234 lb)   02/07/22 107.5 kg (237 lb)        BP Readings from Last 6 Encounters:   12/04/23 132/76   11/15/22 128/78   02/07/22 126/72   11/08/21 126/76   11/04/19 128/80      The 10-year ASCVD risk score (Grover ENRIQUE, et al., 2019) is: 3.4%    Values used to calculate the score:      Age: 53 years      Sex: Male      Is Non- : No      Diabetic: No      Tobacco smoker: No      Systolic Blood Pressure: 132 mmHg      Is BP treated: No      HDL Cholesterol: 35 mg/dL      Total Cholesterol: 134 mg/dL    He reports that he has quit smoking. He quit smokeless tobacco use about 19 years ago.  His smokeless tobacco use included chew.      Appropriate preventive services were discussed with this patient, including applicable screening as appropriate for fall prevention, nutrition, physical activity, Tobacco-use cessation, weight loss and cognition.  Checklist reviewing preventive services available has been given to the patient.    Reviewed patients plan of care and provided an AVS. The Basic Care Plan (routine screening as documented in Health Maintenance) for Fred meets the Care Plan requirement. This Care Plan has been established and reviewed with the Patient.      Edmond Pemberton MD, MD  [facility]    Identified Health Risks:    Answers submitted " by the patient for this visit:  Patient Health Questionnaire (Submitted on 12/4/2023)  If you checked off any problems, how difficult have these problems made it for you to do your work, take care of things at home, or get along with other people?: Very difficult  PHQ9 TOTAL SCORE: 10

## 2025-06-03 ENCOUNTER — TELEPHONE (OUTPATIENT)
Dept: FAMILY MEDICINE | Facility: CLINIC | Age: 55
End: 2025-06-03
Payer: MEDICARE

## 2025-06-03 NOTE — TELEPHONE ENCOUNTER
He should keep his scheduled neurosurgery follow-up since it has been several years since he has had follow-up.

## 2025-06-03 NOTE — TELEPHONE ENCOUNTER
General Call      Reason for Call:  patient called and an appt with Neurosurgery and was referred by Dr Pemberton at [facility]     Wondering if needs this appt still?    Please contact patient.  Thank you.    What are your questions or concerns:  no    Date of last appointment with provider: Dec 2024    Could we send this information to you in Dipity or would you prefer to receive a phone call?:   Patient would prefer a phone call   Okay to leave a detailed message?: Yes at Cell number on file:    Telephone Information:   Mobile [phone]

## 2025-06-03 NOTE — TELEPHONE ENCOUNTER
Has upcoming appt with Neurology next week.    Next Appt  With Neurosurgery  06/09/2025 at 2:00 PM      Should he keep this appointment?

## 2025-06-04 NOTE — TELEPHONE ENCOUNTER
See response from the PCP, to the patient, on 6/3/25, regarding a neurology appointment.    Writer called the patient and relayed the above message to the patient, who verbalized understanding and agrees with the plan.    Denies other questions or concerns at this time.    Lisset Kaufman RN, BSN  [facility]

## 2025-06-09 ENCOUNTER — VIRTUAL VISIT (OUTPATIENT)
Dept: NEUROSURGERY | Facility: CLINIC | Age: 55
End: 2025-06-09
Attending: FAMILY MEDICINE
Payer: MEDICARE

## 2025-06-09 DIAGNOSIS — Q28.3 CONGENITAL ANOMALY OF CEREBROVASCULAR SYSTEM: Primary | ICD-10-CM

## 2025-06-09 PROCEDURE — 98001 SYNCH AUDIO-VIDEO NEW LOW 30: CPT | Performed by: NURSE PRACTITIONER

## 2025-06-09 PROCEDURE — 1126F AMNT PAIN NOTED NONE PRSNT: CPT | Performed by: NURSE PRACTITIONER

## 2025-06-09 ASSESSMENT — PAIN SCALES - GENERAL: PAINLEVEL_OUTOF10: NO PAIN (0)

## 2025-06-09 NOTE — LETTER
2025       RE: Fred Shukla  [address] [place] [address]     Dear Colleague,    Thank you for referring your patient, Fred Shukla, to the [facility] NEUROSURGERY CLINIC [place] [facility]. Please see a copy of my visit note below.    Virtual Visit Details    Type of service:  Video Visit   Video Start Time: 2.00  Video End Time:2.32    Originating Location (pt. Location): Home    Distant Location (provider location):  Off-site  Platform used for Video Visit: [facility]  Department of Neurosurgery      Name: Fred Shukla  MRN: [id number]  Age: 54 year old  : 1970  Referring provider: Edmond Pemberton  2025      Chief Complaint:   Arteriovenous malformation  S/p David embolization and surgical resection in  at OSH  Right posterior cerebral artery aneurysm  S/p endovascular coil embolization in   New patient    History of Present Illness:   Fred Shukla is a 54 year old male with a history of dyslipidemia, chronic mental illness, previous right temporal region arteriovenous malformation. He underwent David embolization followed by surgical resection of the arteriovenous malformation in 2012. At that time, he also underwent endovascular coil embolization of a flow related right posterior cerebral artery aneurysm.  These procedures were performed at OSH by Fara Whitehead and Liv.     Patient is trying to establish care with [facility].  Today I had a video visit with the patient.  He denies any new neurological concerns.  Per patient, he possibly had a follow-up angiogram in .  Although on my review in Care Everywhere, most recent follow-up angiogram was done in 2014 which showed a complete occlusion of the right temporal region AVM and complete coil occlusion of right posterior cerebral artery aneurysm.  Most recent follow-up MRA was done in 2019  which showed no evidence of recurrent arteriovenous malformation or aneurysm.      Review of Systems:   Pertinent items are noted in HPI or as in patient entered ROS below, remainder of complete ROS is negative.        No data to display                 Active Medications:   No current outpatient medications on file.      Allergies:   Oxycodone      Past Medical History:  Past Medical History:   Diagnosis Date     AVM (arteriovenous malformation)      Chlamydia 2004     Patient Active Problem List   Diagnosis     Cerebral Arteriovenous Malformation     Chronic mental illness     Peyronie's disease     Chronic pain     Obesity     Slowing of urinary stream     Pelvic and perineal pain     Disorder of penis        Past Surgical History:  Past Surgical History:   Procedure Laterality Date     ANKLE SURGERY Right 05/06/2016    Peroneal Subluxation of Tendons. Dr. Ramires     CRANIOTOMY FOR AVM  01/19/2012    Headaches     IR CAROTID ANGIOGRAM  1/5/2012     IR CAROTID ANGIOGRAM  1/5/2012     IR CAROTID ANGIOGRAM  1/5/2012     IR CAROTID ANGIOGRAM  1/19/2012     IR CAROTID ANGIOGRAM  1/19/2012     IR MISCELLANEOUS PROCEDURE  1/5/2012     IR MISCELLANEOUS PROCEDURE  1/5/2012     IR MISCELLANEOUS PROCEDURE  1/5/2012     IR MISCELLANEOUS PROCEDURE  1/17/2012     IR MISCELLANEOUS PROCEDURE  1/17/2012     IR MISCELLANEOUS PROCEDURE  1/17/2012     IR MISCELLANEOUS PROCEDURE  1/17/2012     IR MISCELLANEOUS PROCEDURE  1/17/2012     IR MISCELLANEOUS PROCEDURE  1/17/2012     IR MISCELLANEOUS PROCEDURE  1/19/2012     IR MISCELLANEOUS PROCEDURE  1/19/2012       Family History:   Family History   Problem Relation Age of Onset     Snoring Father      Alcoholism Father      No Known Problems Mother      Bipolar Disorder Sister          Social History:   Social History     Tobacco Use     Smoking status: Former     Smokeless tobacco: Former     Types: Chew     Quit date: 4/1/2004     Tobacco comments:     Casual    Vaping Use     Vaping  status: Former   Substance Use Topics     Alcohol use: No     Drug use: No     Comment: Drug use: None since high school        Physical Exam:   There were no vitals taken for this visit.   General: No acute distress.   Neuro: The patient is fully oriented. Speech is normal. Psych: Normal mood and affect. Behavior is normal.      Imaging:  No recent imaging     Assessment:  Arteriovenous malformation  S/p David embolization and surgical resection in 2012 at OSH  Right posterior cerebral artery aneurysm  S/p endovascular coil embolization in 2012  New patient    Plan:  Patient denies any new neurological concerns.  He would like to establish care with [facility] for further follow-up for AVM and aneurysm.  Will review case with Dr. Escalera and patient will be contacted with the follow-up imaging recommendations.      Arlen Sánchez CNP  Department of Neurosurgery    I spent 35 minutes on patient care activities related to this encounter on the date of service, including time spent reviewing the chart, obtaining history and examination and in counseling the patient, and in documentation in the electronic medical record.        Again, thank you for allowing me to participate in the care of your patient.      Sincerely,    VETO Dsouza CNP

## 2025-06-09 NOTE — NURSING NOTE
02/28/23 1504   IMM Letter   IMM Letter given to Patient/Family/Significant other/Guardian/POA/by: Second IMM given to patient and daughter by Aydee Galindo RN/CM   IMM Letter date given: 02/28/23   IMM Letter time given: 5264  (Daughter signed) Current patient location: [address] [place] [address] [address]  [facility] [address]    Is the patient currently in the state of [place]? YES    Visit mode: VIDEO    If the visit is dropped, the patient can be reconnected by:VIDEO VISIT: Text to cell phone:   Telephone Information:   Mobile [phone]       Will anyone else be joining the visit? NO  (If patient encounters technical issues they should call [phone])    Are changes needed to the allergy or medication list? No    Are refills needed on medications prescribed by this physician? NO    Rooming Documentation:  Questionnaire(s) not pre-assigned    Reason for visit: Consult    Francesca VALLE

## 2025-06-09 NOTE — PROGRESS NOTES
Virtual Visit Details    Type of service:  Video Visit   Video Start Time: 2.00  Video End Time:2.32    Originating Location (pt. Location): Home    Distant Location (provider location):  Off-site  Platform used for Video Visit: [facility]  Department of Neurosurgery      Name: Fred Shukla  MRN: [id number]  Age: 54 year old  : 1970  Referring provider: Edmond Pemberton  2025      Chief Complaint:   Arteriovenous malformation  S/p David embolization and surgical resection in  at OSH  Right posterior cerebral artery aneurysm  S/p endovascular coil embolization in   New patient    History of Present Illness:   Fred Shukla is a 54 year old male with a history of dyslipidemia, chronic mental illness, previous right temporal region arteriovenous malformation. He underwent [place] embolization followed by surgical resection of the arteriovenous malformation in 2012. At that time, he also underwent endovascular coil embolization of a flow related right posterior cerebral artery aneurysm.  These procedures were performed at OSH by Fara Whitehead and Liv.     Patient is trying to establish care with [facility].  Today I had a video visit with the patient.  He denies any new neurological concerns.  Per patient, he possibly had a follow-up angiogram in 2019.  Although on my review in Care Everywhere, most recent follow-up angiogram was done in 2014 which showed a complete occlusion of the right temporal region AVM and complete coil occlusion of right posterior cerebral artery aneurysm.  Most recent follow-up MRA was done in 2019 which showed no evidence of recurrent arteriovenous malformation or aneurysm.      Review of Systems:   Pertinent items are noted in HPI or as in patient entered ROS below, remainder of complete ROS is negative.        No data to display                 Active Medications:   No current outpatient medications on file.       Allergies:   Oxycodone      Past Medical History:  Past Medical History:   Diagnosis Date    AVM (arteriovenous malformation)     Chlamydia 2004     Patient Active Problem List   Diagnosis    Cerebral Arteriovenous Malformation    Chronic mental illness    Peyronie's disease    Chronic pain    Obesity    Slowing of urinary stream    Pelvic and perineal pain    Disorder of penis        Past Surgical History:  Past Surgical History:   Procedure Laterality Date    ANKLE SURGERY Right 05/06/2016    Peroneal Subluxation of Tendons. Dr. Ramires    CRANIOTOMY FOR AVM  01/19/2012    Headaches    IR CAROTID ANGIOGRAM  1/5/2012    IR CAROTID ANGIOGRAM  1/5/2012    IR CAROTID ANGIOGRAM  1/5/2012    IR CAROTID ANGIOGRAM  1/19/2012    IR CAROTID ANGIOGRAM  1/19/2012    IR MISCELLANEOUS PROCEDURE  1/5/2012    IR MISCELLANEOUS PROCEDURE  1/5/2012    IR MISCELLANEOUS PROCEDURE  1/5/2012    IR MISCELLANEOUS PROCEDURE  1/17/2012    IR MISCELLANEOUS PROCEDURE  1/17/2012    IR MISCELLANEOUS PROCEDURE  1/17/2012    IR MISCELLANEOUS PROCEDURE  1/17/2012    IR MISCELLANEOUS PROCEDURE  1/17/2012    IR MISCELLANEOUS PROCEDURE  1/17/2012    IR MISCELLANEOUS PROCEDURE  1/19/2012    IR MISCELLANEOUS PROCEDURE  1/19/2012       Family History:   Family History   Problem Relation Age of Onset    Snoring Father     Alcoholism Father     No Known Problems Mother     Bipolar Disorder Sister          Social History:   Social History     Tobacco Use    Smoking status: Former    Smokeless tobacco: Former     Types: Chew     Quit date: 4/1/2004    Tobacco comments:     Casual    Vaping Use    Vaping status: Former   Substance Use Topics    Alcohol use: No    Drug use: No     Comment: Drug use: None since high school        Physical Exam:   There were no vitals taken for this visit.   General: No acute distress.   Neuro: The patient is fully oriented. Speech is normal. Psych: Normal mood and affect. Behavior is normal.      Imaging:  No recent imaging      Assessment:  Arteriovenous malformation  S/p David embolization and surgical resection in 2012 at OSH  Right posterior cerebral artery aneurysm  S/p endovascular coil embolization in 2012  New patient    Plan:  Patient denies any new neurological concerns.  He would like to establish care with [facility] for further follow-up for AVM and aneurysm.  Will review case with Dr. Escalera and patient will be contacted with the follow-up imaging recommendations.    Addendum on 6/12/2025: Discussed with Dr. Escalera.  Patient to complete a brain MRI/ MRA  and follow-up with Dr. Escalera after imaging.    Arlen Sánchez CNP  Department of Neurosurgery    I spent 35 minutes on patient care activities related to this encounter on the date of service, including time spent reviewing the chart, obtaining history and examination and in counseling the patient, and in documentation in the electronic medical record.

## 2025-06-16 ENCOUNTER — HOSPITAL ENCOUNTER (OUTPATIENT)
Dept: MRI IMAGING | Facility: CLINIC | Age: 55
Discharge: HOME | End: 2025-06-16
Attending: NURSE PRACTITIONER | Admitting: NURSE PRACTITIONER
Payer: MEDICARE

## 2025-06-16 DIAGNOSIS — Q28.3 CONGENITAL ANOMALY OF CEREBROVASCULAR SYSTEM: ICD-10-CM

## 2025-06-16 PROCEDURE — 70544 MR ANGIOGRAPHY HEAD W/O DYE: CPT

## 2025-06-16 PROCEDURE — 70553 MRI BRAIN STEM W/O & W/DYE: CPT

## 2025-06-16 PROCEDURE — 255N000002 HC RX 255 OP 636: Performed by: NURSE PRACTITIONER

## 2025-06-16 PROCEDURE — A9585 GADOBUTROL INJECTION: HCPCS | Performed by: NURSE PRACTITIONER

## 2025-06-16 RX ORDER — GADOBUTROL 604.72 MG/ML
10 INJECTION INTRAVENOUS ONCE
Status: COMPLETED | OUTPATIENT
Start: 2025-06-16 | End: 2025-06-16

## 2025-06-16 RX ADMIN — GADOBUTROL 10 ML: 604.72 INJECTION INTRAVENOUS at 18:46

## 2025-07-02 ENCOUNTER — TELEPHONE (OUTPATIENT)
Dept: NEUROSURGERY | Facility: CLINIC | Age: 55
End: 2025-07-02
Payer: MEDICARE

## 2025-07-02 NOTE — TELEPHONE ENCOUNTER
REASON FOR VISIT: AVM/ Review MRI and MRA   PROVIDER: Steve Escalera MD   DATE OF APPT: 7/7/25    NOTES (FOR ALL VISITS) STATUS DETAILS   OFFICE NOTE from referring provider Internal 6/9/25 Arlen Sánchez APRN CNP @[facility]-NeuroSurg     OFFICE NOTE from other specialist Internal 12/6/24, 11/4/19, 1/23/18 Edmond Pemberton MD @[facility]     11/2/16 Dylon Blood MD @[facility]      IMAGING  (FOR ALL VISITS)     MRI (HEAD, NECK, SPINE) Internal [facility]  6/16/25 MRA Brain (COW)  6/16/25 MR Brain  10/10/19 MRA Brain (COW)

## 2025-07-02 NOTE — TELEPHONE ENCOUNTER
Called patient and scheduled appointment w/ Dr. Escalera per Shabnam Silvio via Patient call list. -KB

## 2025-07-07 ENCOUNTER — VIRTUAL VISIT (OUTPATIENT)
Dept: NEUROSURGERY | Facility: CLINIC | Age: 55
End: 2025-07-07
Payer: COMMERCIAL

## 2025-07-07 ENCOUNTER — PRE VISIT (OUTPATIENT)
Dept: NEUROSURGERY | Facility: CLINIC | Age: 55
End: 2025-07-07

## 2025-07-07 VITALS — HEIGHT: 75 IN | WEIGHT: 235 LBS | BODY MASS INDEX: 29.22 KG/M2

## 2025-07-07 DIAGNOSIS — Q28.3 CONGENITAL ANOMALY OF CEREBROVASCULAR SYSTEM: Primary | ICD-10-CM

## 2025-07-07 PROCEDURE — 1126F AMNT PAIN NOTED NONE PRSNT: CPT | Performed by: NEUROLOGICAL SURGERY

## 2025-07-07 PROCEDURE — 98005 SYNCH AUDIO-VIDEO EST LOW 20: CPT | Performed by: NEUROLOGICAL SURGERY

## 2025-07-07 ASSESSMENT — PAIN SCALES - GENERAL: PAINLEVEL_OUTOF10: NO PAIN (0)

## 2025-07-07 ASSESSMENT — PATIENT HEALTH QUESTIONNAIRE - PHQ9: SUM OF ALL RESPONSES TO PHQ QUESTIONS 1-9: 13

## 2025-07-07 NOTE — PROGRESS NOTES
Virtual Visit Details    I had the pleasure to talk to Gallo today during a neurosurgery video visit.  He gave consent for this visit.    Briefly Gallo is a 54-year-old male who has a history of a right temporal lobe AVM.  This was resected at an outside hospital.  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    He has been followed over time at an outside hospital.  Follow-up angiograms demonstrate no residual or recurrent AVM or aneurysm.    He recently had a repeat MRA.  I have reviewed this and does not demonstrate any residual or recurrent AVM or aneurysm.    I am happy to follow him at over time.  He does not need anything at this point in time.  I would like to repeat an MR angiogram of the brain in 2 years and have him see us at that point in time.  He is agreeable to this plan.

## 2025-07-07 NOTE — NURSING NOTE
Current patient location: [address] [place] [address] [address]  [facility] [address]    Is the patient currently in the state of [place]? YES    Visit mode: VIDEO    If the visit is dropped, the patient can be reconnected by:VIDEO VISIT: Text to cell phone:   Telephone Information:   Mobile [phone]       Will anyone else be joining the visit? NO  (If patient encounters technical issues they should call [phone])    Are changes needed to the allergy or medication list? Pt stated no changes to allergies and Pt stated no med changes    Are refills needed on medications prescribed by this physician? NO    Rooming Documentation:  Not applicable    Reason for visit: Consult    Marlin Sheehan VVF    Depression Response    Patient completed the PHQ-9 assessment for depression and scored >9? Yes  Question 9 on the PHQ-9 was positive for suicidality? Yes  Does patient have current mental health provider? No    Is this a virtual visit? Yes   Does patient have suicidal ideation (positive question 9)? Yes (adult) - transfer to [facility] Triage ([phone]) Patient declined transfer.  Notify provider.     I personally notified the following: visit provider

## 2025-07-07 NOTE — LETTER
7/7/2025       RE: Fred Shukla  [address] [facility] [address]     Dear Colleague,    Thank you for referring your patient, Fred Shukla, to the [facility]UROS[facility] [place] at [facility]. Please see a copy of my visit note below.    Virtual Visit Details    I had the pleasure to talk to Gallo today during a neurosurgery video visit.  He gave consent for this visit.    Briefly Gallo is a 54-year-old male who has a history of a right temporal lobe AVM.  This was resected at an outside hospital.  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    He has been followed over time at an outside hospital.  Follow-up angiograms demonstrate no residual or recurrent AVM or aneurysm.    He recently had a repeat MRA.  I have reviewed this and does not demonstrate any residual or recurrent AVM or aneurysm.    I am happy to follow him at over time.  He does not need anything at this point in time.  I would like to repeat an MR angiogram of the brain in 2 years and have him see us at that point in time.  He is agreeable to this plan.    Again, thank you for allowing me to participate in the care of your patient.      Sincerely,    Steve Escalera MD

## 2025-07-08 NOTE — PATIENT INSTRUCTIONS
Follow-up with Dr. Escalera in 2 years with an MRA prior to your appointment.    Stroke & Endovascular RN Care Coordinators:    Kirsten Nino, RN, BSN  Yuliya Maldonado, RN, CNRN, SCRN    If you have any questions please contact the RN Care Coordinators at [phone], option 1.    After business hours call the  at [phone] and have the Neuro-Interventional Fellow paged.    Thank you for choosing [facility] for your health care needs.